# Patient Record
Sex: FEMALE | Race: WHITE | Employment: FULL TIME | URBAN - METROPOLITAN AREA
[De-identification: names, ages, dates, MRNs, and addresses within clinical notes are randomized per-mention and may not be internally consistent; named-entity substitution may affect disease eponyms.]

---

## 2017-10-06 ENCOUNTER — OFFICE VISIT (OUTPATIENT)
Dept: SURGERY | Age: 57
End: 2017-10-06

## 2017-10-06 VITALS
SYSTOLIC BLOOD PRESSURE: 135 MMHG | RESPIRATION RATE: 20 BRPM | DIASTOLIC BLOOD PRESSURE: 83 MMHG | BODY MASS INDEX: 32.6 KG/M2 | WEIGHT: 184 LBS | TEMPERATURE: 97.7 F | HEART RATE: 87 BPM | HEIGHT: 63 IN

## 2017-10-06 DIAGNOSIS — I72.8 SPLENIC ARTERY ANEURYSM (HCC): ICD-10-CM

## 2017-10-06 DIAGNOSIS — Z98.84 H/O LAPAROSCOPIC ADJUSTABLE GASTRIC BANDING: Primary | ICD-10-CM

## 2017-10-06 PROBLEM — K95.09 EROSION OF GASTRIC BAND: Status: ACTIVE | Noted: 2017-10-06

## 2017-10-06 NOTE — PROGRESS NOTES
Aldo Lord is a 56 y.o. female who presents today with   Chief Complaint   Patient presents with    Abdominal Pain     Pt pt presents today c/o abd  pain after eating for over 1 years. Pt had lap band placed in [facility] about 10 years ago. Ct Abd/Pelvis 9/9/2017                  1. Have you been to the ER, urgent care clinic since your last visit? Hospitalized since your last visit? No    2. Have you seen or consulted any other health care providers outside of the [facility] since your last visit? Include any pap smears or colon screening.  No

## 2017-10-06 NOTE — PROGRESS NOTES
Hernia Evaluation      Subjective:     Kevin Rider is a 56 y.o. female with a history of lap gastric banding in [facility] in . Her pre-op weight was 265. She lost to 173. She has had about 2 adjustments early in the course by a doctor in [place]. She has never followed up with her doctor in [facility].    For the last year, she complains of \"excruciating pain. \" She describes the pain as occurring about every other pain. The pain comes without relation to eating. She denies nausea or vomiting. The pain is sharp, lasting 20-30 minutes usually at the left costal margin. Patient Active Problem List    Diagnosis Date Noted    H/O laparoscopic adjustable gastric banding 10/06/2017    Erosion of gastric band 10/06/2017    Splenic artery aneurysm ([address] 10/06/2017     Past Medical History:   Diagnosis Date    Constipation     Coxsackie carditis       Past Surgical History:   Procedure Laterality Date    ADJUSTMENT GASTRIC BAND      HX HEENT      Plastic surgery to repair face after dog bite    HX LIPECTOMY        Social History   Substance Use Topics    Smoking status: Never Smoker    Smokeless tobacco: Never Used    Alcohol use Yes      Comment: occ      Family History   Problem Relation Age of Onset    Cancer Father      Throat CA, Smoker      Family Status   Relation Status    Mother Alive    Father        No current outpatient prescriptions on file. No current facility-administered medications for this visit.        No Known Allergies     Review of Systems:  Positive in BOLD    CONST: Fever, weight loss, fatigue or chills  GI: Nausea, vomiting, abdominal pain, change in bowel habits, hematochezia, melena, and GERD   INTEG: Dermatitis, abnormal moles  HEENT: Recent changes in vision, vertigo, epistaxis, dysphagia and hoarseness  CV: Chest pain, palpitations, HTN, edema and varicosities  RESP: Cough, shortness of breath, wheezing, hemoptysis, snoring and reactive airway disease  : Hematuria, dysuria, frequency, urgency, nocturia and stress urinary incontinence   MS: Weakness, joint pain and arthritis  ENDO: Diabetes, thyroid disease, polyuria, polydipsia, polyphagia, poor wound healing, heat intolerance, cold intolerance  LYMPH/HEME: Anemia, bruising and history of blood transfusions  NEURO: Dizziness, headache, fainting, seizures and stroke  PSYCH: Anxiety and depression    Objective:     Visit Vitals    /83 (BP 1 Location: Right arm, BP Patient Position: At rest)    Pulse 87    Temp 97.7 °F (36.5 °C) (Oral)    Resp 20    Ht 5' 3\" (1.6 m)    Wt 83.5 kg (184 lb)    BMI 32.59 kg/m2       Physical Exam:      GENERAL: alert, cooperative, no distress, appears stated age  EYE:conjunctivae and sclerae normal, pupils equal, round, reactive to light, extraocular movements intact without nystagmus  THROAT & NECK: no erythema or exudates noted and neck supple and symmetrical; no palpable masses  LUNG: clear to auscultation bilaterally  HEART: Regular rate and rhythm  ABDOMEN:abdomen is soft without significant tenderness, masses, organomegaly or guarding, left upper quadrant port site  EXTREMITIES:  extremities normal, atraumatic, no cyanosis or edema  SKIN: Normal.    Imaging and Lab Review:     CT - 1 and 1.1 cm splenic artery aneurysm    EGD - erosion of lap band. With attachment at angle of His    Assessment:   Eroded gastric band  Splenic artery aneurysm    Patient has significant symptoms and wishes to proceed with surgical intervention. Plan:   1. Splenic artery aneurysms - watchful waiting per vascular surgery  2. I explained the indications for lap explant of eroded gastric band as well as the alternatives.  I discussed the potential risks, benefits, and likely outcomes of this course of care, including but not limited to bleeding, wound infection, need for reoperation, injury to surrounding structures,gastric leak or abscess, failure to improve or even worsen her pain, anesthetic risks and imponderables to include death. The patient indicates understanding of the risks and wishes to proceed.           Signed By: Lizz Cormier MD     October 6, 2017

## 2017-10-06 NOTE — COMMUNICATION BODY
Hernia Evaluation      Subjective:     Felisha Loomis is a 56 y.o. female with a history of lap gastric banding in [facility] in . Her pre-op weight was 265. She lost to 173. She has had about 2 adjustments early in the course by a doctor in [facility]. She has never followed up with her doctor in [facility].    For the last year, she complains of \"excruciating pain. \" She describes the pain as occurring about every other pain. The pain comes without relation to eating. She denies nausea or vomiting. The pain is sharp, lasting 20-30 minutes usually at the left costal margin. Patient Active Problem List    Diagnosis Date Noted    H/O laparoscopic adjustable gastric banding 10/06/2017    Erosion of gastric band 10/06/2017    Splenic artery aneurysm ([address] 10/06/2017     Past Medical History:   Diagnosis Date    Constipation     Coxsackie carditis       Past Surgical History:   Procedure Laterality Date    ADJUSTMENT GASTRIC BAND      HX HEENT      Plastic surgery to repair face after dog bite    HX LIPECTOMY        Social History   Substance Use Topics    Smoking status: Never Smoker    Smokeless tobacco: Never Used    Alcohol use Yes      Comment: occ      Family History   Problem Relation Age of Onset    Cancer Father      Throat CA, Smoker      Family Status   Relation Status    Mother Alive    Father        No current outpatient prescriptions on file. No current facility-administered medications for this visit.        No Known Allergies     Review of Systems:  Positive in BOLD    CONST: Fever, weight loss, fatigue or chills  GI: Nausea, vomiting, abdominal pain, change in bowel habits, hematochezia, melena, and GERD   INTEG: Dermatitis, abnormal moles  HEENT: Recent changes in vision, vertigo, epistaxis, dysphagia and hoarseness  CV: Chest pain, palpitations, HTN, edema and varicosities  RESP: Cough, shortness of breath, wheezing, hemoptysis, snoring and reactive airway disease  : Hematuria, dysuria, frequency, urgency, nocturia and stress urinary incontinence   MS: Weakness, joint pain and arthritis  ENDO: Diabetes, thyroid disease, polyuria, polydipsia, polyphagia, poor wound healing, heat intolerance, cold intolerance  LYMPH/HEME: Anemia, bruising and history of blood transfusions  NEURO: Dizziness, headache, fainting, seizures and stroke  PSYCH: Anxiety and depression    Objective:     Visit Vitals    /83 (BP 1 Location: Right arm, BP Patient Position: At rest)    Pulse 87    Temp 97.7 °F (36.5 °C) (Oral)    Resp 20    Ht 5' 3\" (1.6 m)    Wt 83.5 kg (184 lb)    BMI 32.59 kg/m2       Physical Exam:      GENERAL: alert, cooperative, no distress, appears stated age  EYE:conjunctivae and sclerae normal, pupils equal, round, reactive to light, extraocular movements intact without nystagmus  THROAT & NECK: no erythema or exudates noted and neck supple and symmetrical; no palpable masses  LUNG: clear to auscultation bilaterally  HEART: Regular rate and rhythm  ABDOMEN:abdomen is soft without significant tenderness, masses, organomegaly or guarding, left upper quadrant port site  EXTREMITIES:  extremities normal, atraumatic, no cyanosis or edema  SKIN: Normal.    Imaging and Lab Review:     CT - 1 and 1.1 cm splenic artery aneurysm    EGD - erosion of lap band. With attachment at angle of His    Assessment:   Eroded gastric band  Splenic artery aneurysm    Patient has significant symptoms and wishes to proceed with surgical intervention. Plan:   1. Splenic artery aneurysms - watchful waiting per vascular surgery  2. I explained the indications for lap explant of eroded gastric band as well as the alternatives.  I discussed the potential risks, benefits, and likely outcomes of this course of care, including but not limited to bleeding, wound infection, need for reoperation, injury to surrounding structures,gastric leak or abscess, failure to improve or even worsen her pain, anesthetic risks and imponderables to include death. The patient indicates understanding of the risks and wishes to proceed.           Signed By: Lori Rachel MD     October 6, 2017

## 2017-10-06 NOTE — LETTER
10/6/2017 1:47 PM 
 
Patient:  Javier Martinez YOB: 1960 Date of Visit: 10/6/2017 Timoteo Mathews MD 
[address] [facility] [address] VIA Facsimile: [phone] Stef Crowe MD 
[address] [address] VIA Facsimile: [phone] Dear MD Stef Bond MD, [facility] and [facility]. Below are the relevant portions of my assessment and plan of care. Hernia Evaluation Subjective:  
 
Cassie Parra is a 56 y.o. female with a history of lap gastric banding in [facility] in 2007. Her pre-op weight was 265. She lost to 173. She has had about 2 adjustments early in the course by a doctor in [facility]. She has never followed up with her doctor in [facility]. 
 
For the last year, she complains of \"excruciating pain. \" She describes the pain as occurring about every other pain. The pain comes without relation to eating. She denies nausea or vomiting. The pain is sharp, lasting 20-30 minutes usually at the left costal margin. Patient Active Problem List  
 Diagnosis Date Noted  H/O laparoscopic adjustable gastric banding 10/06/2017  Erosion of gastric band 10/06/2017  Splenic artery aneurysm ([address] 10/06/2017 Past Medical History:  
Diagnosis Date  Constipation  Coxsackie carditis 1986 Past Surgical History:  
Procedure Laterality Date  ADJUSTMENT GASTRIC BAND  HX HEENT  [id number] Plastic surgery to repair face after dog bite  HX LIPECTOMY Social History Substance Use Topics  Smoking status: Never Smoker  Smokeless tobacco: Never Used  Alcohol use Yes Comment: occ Family History Problem Relation Age of Onset  Cancer Father Throat CA, Smoker Family Status Relation Status  Mother Alive  Father  No current outpatient prescriptions on file. No current facility-administered medications for this visit. No Known Allergies Review of Systems:  Positive in BOLD 
 
CONST: Fever, weight loss, fatigue or chills GI: Nausea, vomiting, abdominal pain, change in bowel habits, hematochezia, melena, and GERD INTEG: Dermatitis, abnormal moles HEENT: Recent changes in vision, vertigo, epistaxis, dysphagia and hoarseness CV: Chest pain, palpitations, HTN, edema and varicosities RESP: Cough, shortness of breath, wheezing, hemoptysis, snoring and reactive airway disease : Hematuria, dysuria, frequency, urgency, nocturia and stress urinary incontinence MS: Weakness, joint pain and arthritis ENDO: Diabetes, thyroid disease, polyuria, polydipsia, polyphagia, poor wound healing, heat intolerance, cold intolerance LYMPH/HEME: Anemia, bruising and history of blood transfusions NEURO: Dizziness, headache, fainting, seizures and stroke PSYCH: Anxiety and depression Objective:  
 
Visit Vitals  /83 (BP 1 Location: Right arm, BP Patient Position: At rest)  Pulse 87  Temp 97.7 °F (36.5 °C) (Oral)  Resp 20  
 Ht 5' 3\" (1.6 m)  Wt 83.5 kg (184 lb)  BMI 32.59 kg/m2 Physical Exam:   
 
GENERAL: alert, cooperative, no distress, appears stated age EYE:conjunctivae and sclerae normal, pupils equal, round, reactive to light, extraocular movements intact without nystagmus THROAT & NECK: no erythema or exudates noted and neck supple and symmetrical; no palpable masses LUNG: clear to auscultation bilaterally HEART: Regular rate and rhythm ABDOMEN:abdomen is soft without significant tenderness, masses, organomegaly or guarding, left upper quadrant port site EXTREMITIES:  extremities normal, atraumatic, no cyanosis or edema SKIN: Normal. 
 
Imaging and Lab Review: CT - 1 and 1.1 cm splenic artery aneurysm EGD - erosion of lap band. With attachment at angle of His Assessment:  
Eroded gastric band Splenic artery aneurysm Patient has significant symptoms and wishes to proceed with surgical intervention. Plan: 1. Splenic artery aneurysms - watchful waiting per vascular surgery 2. I explained the indications for lap explant of eroded gastric band as well as the alternatives. I discussed the potential risks, benefits, and likely outcomes of this course of care, including but not limited to bleeding, wound infection, need for reoperation, injury to surrounding structures,gastric leak or abscess, failure to improve or even worsen her pain, anesthetic risks and imponderables to include death. The patient indicates understanding of the risks and wishes to proceed. Signed By: Perico Tam MD   
 October 6, 2017 Thank you very much for your referral of Ms. Chen Shaw. If you have questions, please do not hesitate to call me. I look forward to following MsMoy Barbara Wahl along with you and will keep you updated as to her progress.   
 
 
 
 
Sincerely, 
 
 
Perico Tam MD

## 2017-11-16 ENCOUNTER — TELEPHONE (OUTPATIENT)
Dept: SURGERY | Age: 57
End: 2017-11-16

## 2017-11-16 NOTE — TELEPHONE ENCOUNTER
Called to check status of patients auth. She has been approved for the the band removal ONLY. LVM to let pt know. Dorina Thomas number is: [phone] good for code 83355 (this is the code the insurance company uses for band removal) Code is good from 11/06/17-05/06/18 outpatient ONLY. Pt can be schedule once she calls back at [facility] any day between these dates.

## 2017-11-17 ENCOUNTER — ANESTHESIA EVENT (OUTPATIENT)
Dept: SURGERY | Age: 57
End: 2017-11-17
Payer: COMMERCIAL

## 2017-11-20 ENCOUNTER — HOSPITAL ENCOUNTER (OUTPATIENT)
Age: 57
Setting detail: OBSERVATION
Discharge: HOME | End: 2017-11-21
Attending: SURGERY | Admitting: SURGERY
Payer: COMMERCIAL

## 2017-11-20 ENCOUNTER — ANESTHESIA (OUTPATIENT)
Dept: SURGERY | Age: 57
End: 2017-11-20
Payer: COMMERCIAL

## 2017-11-20 PROBLEM — E66.01 MORBID OBESITY (HCC): Status: ACTIVE | Noted: 2017-11-20

## 2017-11-20 PROBLEM — K95.09 COMPLICATION OF GASTRIC BANDING: Status: ACTIVE | Noted: 2017-11-20

## 2017-11-20 LAB — HCG UR QL: NEGATIVE

## 2017-11-20 PROCEDURE — 74011250636 HC RX REV CODE- 250/636: Performed by: NURSE ANESTHETIST, CERTIFIED REGISTERED

## 2017-11-20 PROCEDURE — 81025 URINE PREGNANCY TEST: CPT

## 2017-11-20 PROCEDURE — 77030008683 HC TU ET CUF COVD -A: Performed by: ANESTHESIOLOGY

## 2017-11-20 PROCEDURE — 74011250637 HC RX REV CODE- 250/637: Performed by: SURGERY

## 2017-11-20 PROCEDURE — 77030002933 HC SUT MCRYL J&J -A: Performed by: SURGERY

## 2017-11-20 PROCEDURE — 77030018846 HC SOL IRR STRL H20 ICUM -A: Performed by: SURGERY

## 2017-11-20 PROCEDURE — 77030008477 HC STYL SATN SLP COVD -A: Performed by: ANESTHESIOLOGY

## 2017-11-20 PROCEDURE — 77030031139 HC SUT VCRL2 J&J -A: Performed by: SURGERY

## 2017-11-20 PROCEDURE — 77030009852 HC PCH RTVR ENDOSC COVD -B: Performed by: SURGERY

## 2017-11-20 PROCEDURE — 76210000006 HC OR PH I REC 0.5 TO 1 HR: Performed by: SURGERY

## 2017-11-20 PROCEDURE — 99218 HC RM OBSERVATION: CPT

## 2017-11-20 PROCEDURE — 51798 US URINE CAPACITY MEASURE: CPT

## 2017-11-20 PROCEDURE — 88300 SURGICAL PATH GROSS: CPT | Performed by: SURGERY

## 2017-11-20 PROCEDURE — 77030018834: Performed by: SURGERY

## 2017-11-20 PROCEDURE — 77030013079 HC BLNKT BAIR HGGR 3M -A: Performed by: ANESTHESIOLOGY

## 2017-11-20 PROCEDURE — 77030016151 HC PROTCTR LNS DFOG COVD -B: Performed by: SURGERY

## 2017-11-20 PROCEDURE — 77030005515 HC CATH URETH FOL14 BARD -B: Performed by: SURGERY

## 2017-11-20 PROCEDURE — 77030020255 HC SOL INJ LR 1000ML BG

## 2017-11-20 PROCEDURE — 74011250636 HC RX REV CODE- 250/636

## 2017-11-20 PROCEDURE — 96374 THER/PROPH/DIAG INJ IV PUSH: CPT

## 2017-11-20 PROCEDURE — 77030035051: Performed by: SURGERY

## 2017-11-20 PROCEDURE — 77030008603 HC TRCR ENDOSC EPATH J&J -C: Performed by: SURGERY

## 2017-11-20 PROCEDURE — 76010000149 HC OR TIME 1 TO 1.5 HR: Performed by: SURGERY

## 2017-11-20 PROCEDURE — 77030018823 HC SLV COMPR VENO -B: Performed by: SURGERY

## 2017-11-20 PROCEDURE — 76060000033 HC ANESTHESIA 1 TO 1.5 HR: Performed by: SURGERY

## 2017-11-20 PROCEDURE — 77030027491 HC SHR ENDOSC COVD -B: Performed by: SURGERY

## 2017-11-20 PROCEDURE — 74011000250 HC RX REV CODE- 250: Performed by: SURGERY

## 2017-11-20 PROCEDURE — 74011250636 HC RX REV CODE- 250/636: Performed by: SURGERY

## 2017-11-20 PROCEDURE — 77030009393: Performed by: SURGERY

## 2017-11-20 PROCEDURE — 74011000250 HC RX REV CODE- 250

## 2017-11-20 PROCEDURE — 77030008602 HC TRCR ENDOSC EPATH J&J -B: Performed by: SURGERY

## 2017-11-20 PROCEDURE — 77030032490 HC SLV COMPR SCD KNE COVD -B: Performed by: SURGERY

## 2017-11-20 PROCEDURE — 77030036583 HC TRCR CANN BLDLSS OPT MEDT -B: Performed by: SURGERY

## 2017-11-20 PROCEDURE — 77030033639 HC SHR ENDO COAG HARM 36 J&J -E: Performed by: SURGERY

## 2017-11-20 PROCEDURE — 74011000272 HC RX REV CODE- 272: Performed by: SURGERY

## 2017-11-20 RX ORDER — OXYCODONE HYDROCHLORIDE 5 MG/1
5-10 TABLET ORAL
Status: DISCONTINUED | OUTPATIENT
Start: 2017-11-20 | End: 2017-11-21 | Stop reason: HOSPADM

## 2017-11-20 RX ORDER — ENOXAPARIN SODIUM 100 MG/ML
40 INJECTION SUBCUTANEOUS DAILY
Status: DISCONTINUED | OUTPATIENT
Start: 2017-11-21 | End: 2017-11-21 | Stop reason: HOSPADM

## 2017-11-20 RX ORDER — DEXAMETHASONE SODIUM PHOSPHATE 4 MG/ML
INJECTION, SOLUTION INTRA-ARTICULAR; INTRALESIONAL; INTRAMUSCULAR; INTRAVENOUS; SOFT TISSUE AS NEEDED
Status: DISCONTINUED | OUTPATIENT
Start: 2017-11-20 | End: 2017-11-20 | Stop reason: HOSPADM

## 2017-11-20 RX ORDER — VECURONIUM BROMIDE FOR INJECTION 1 MG/ML
INJECTION, POWDER, LYOPHILIZED, FOR SOLUTION INTRAVENOUS AS NEEDED
Status: DISCONTINUED | OUTPATIENT
Start: 2017-11-20 | End: 2017-11-20 | Stop reason: HOSPADM

## 2017-11-20 RX ORDER — CEFAZOLIN SODIUM 2 G/50ML
2 SOLUTION INTRAVENOUS ONCE
Status: COMPLETED | OUTPATIENT
Start: 2017-11-20 | End: 2017-11-20

## 2017-11-20 RX ORDER — HYDROMORPHONE HCL IN 0.9% NACL 50 MG/50ML
1 PLASTIC BAG, INJECTION (ML) INJECTION
Status: DISCONTINUED | OUTPATIENT
Start: 2017-11-20 | End: 2017-11-21 | Stop reason: HOSPADM

## 2017-11-20 RX ORDER — ENOXAPARIN SODIUM 100 MG/ML
40 INJECTION SUBCUTANEOUS ONCE
Status: COMPLETED | OUTPATIENT
Start: 2017-11-20 | End: 2017-11-20

## 2017-11-20 RX ORDER — NEOSTIGMINE METHYLSULFATE 5 MG/5 ML
SYRINGE (ML) INTRAVENOUS AS NEEDED
Status: DISCONTINUED | OUTPATIENT
Start: 2017-11-20 | End: 2017-11-20 | Stop reason: HOSPADM

## 2017-11-20 RX ORDER — SODIUM CHLORIDE, SODIUM LACTATE, POTASSIUM CHLORIDE, CALCIUM CHLORIDE 600; 310; 30; 20 MG/100ML; MG/100ML; MG/100ML; MG/100ML
50 INJECTION, SOLUTION INTRAVENOUS CONTINUOUS
Status: DISCONTINUED | OUTPATIENT
Start: 2017-11-20 | End: 2017-11-20 | Stop reason: HOSPADM

## 2017-11-20 RX ORDER — MIDAZOLAM HYDROCHLORIDE 1 MG/ML
INJECTION, SOLUTION INTRAMUSCULAR; INTRAVENOUS AS NEEDED
Status: DISCONTINUED | OUTPATIENT
Start: 2017-11-20 | End: 2017-11-20 | Stop reason: HOSPADM

## 2017-11-20 RX ORDER — ONDANSETRON 2 MG/ML
4 INJECTION INTRAMUSCULAR; INTRAVENOUS
Status: COMPLETED | OUTPATIENT
Start: 2017-11-20 | End: 2017-11-20

## 2017-11-20 RX ORDER — ACETAMINOPHEN 325 MG/1
650 TABLET ORAL
Status: DISCONTINUED | OUTPATIENT
Start: 2017-11-20 | End: 2017-11-21 | Stop reason: HOSPADM

## 2017-11-20 RX ORDER — DIPHENHYDRAMINE HYDROCHLORIDE 50 MG/ML
25 INJECTION, SOLUTION INTRAMUSCULAR; INTRAVENOUS
Status: DISCONTINUED | OUTPATIENT
Start: 2017-11-20 | End: 2017-11-21 | Stop reason: HOSPADM

## 2017-11-20 RX ORDER — HYDROMORPHONE HYDROCHLORIDE 2 MG/ML
0.5 INJECTION, SOLUTION INTRAMUSCULAR; INTRAVENOUS; SUBCUTANEOUS
Status: DISCONTINUED | OUTPATIENT
Start: 2017-11-20 | End: 2017-11-20 | Stop reason: HOSPADM

## 2017-11-20 RX ORDER — ONDANSETRON 2 MG/ML
4 INJECTION INTRAMUSCULAR; INTRAVENOUS
Status: DISCONTINUED | OUTPATIENT
Start: 2017-11-20 | End: 2017-11-21 | Stop reason: HOSPADM

## 2017-11-20 RX ORDER — SODIUM CHLORIDE, SODIUM LACTATE, POTASSIUM CHLORIDE, CALCIUM CHLORIDE 600; 310; 30; 20 MG/100ML; MG/100ML; MG/100ML; MG/100ML
75 INJECTION, SOLUTION INTRAVENOUS CONTINUOUS
Status: DISCONTINUED | OUTPATIENT
Start: 2017-11-21 | End: 2017-11-20 | Stop reason: HOSPADM

## 2017-11-20 RX ORDER — ONDANSETRON 2 MG/ML
INJECTION INTRAMUSCULAR; INTRAVENOUS AS NEEDED
Status: DISCONTINUED | OUTPATIENT
Start: 2017-11-20 | End: 2017-11-20 | Stop reason: HOSPADM

## 2017-11-20 RX ORDER — KETOROLAC TROMETHAMINE 30 MG/ML
30 INJECTION, SOLUTION INTRAMUSCULAR; INTRAVENOUS EVERY 6 HOURS
Status: COMPLETED | OUTPATIENT
Start: 2017-11-20 | End: 2017-11-21

## 2017-11-20 RX ORDER — FENTANYL CITRATE 50 UG/ML
25 INJECTION, SOLUTION INTRAMUSCULAR; INTRAVENOUS AS NEEDED
Status: DISCONTINUED | OUTPATIENT
Start: 2017-11-20 | End: 2017-11-20 | Stop reason: HOSPADM

## 2017-11-20 RX ORDER — GLYCOPYRROLATE 0.2 MG/ML
INJECTION INTRAMUSCULAR; INTRAVENOUS AS NEEDED
Status: DISCONTINUED | OUTPATIENT
Start: 2017-11-20 | End: 2017-11-20 | Stop reason: HOSPADM

## 2017-11-20 RX ORDER — FENTANYL CITRATE 50 UG/ML
INJECTION, SOLUTION INTRAMUSCULAR; INTRAVENOUS AS NEEDED
Status: DISCONTINUED | OUTPATIENT
Start: 2017-11-20 | End: 2017-11-20 | Stop reason: HOSPADM

## 2017-11-20 RX ORDER — PROPOFOL 10 MG/ML
INJECTION, EMULSION INTRAVENOUS AS NEEDED
Status: DISCONTINUED | OUTPATIENT
Start: 2017-11-20 | End: 2017-11-20 | Stop reason: HOSPADM

## 2017-11-20 RX ORDER — LIDOCAINE HYDROCHLORIDE 20 MG/ML
INJECTION, SOLUTION EPIDURAL; INFILTRATION; INTRACAUDAL; PERINEURAL AS NEEDED
Status: DISCONTINUED | OUTPATIENT
Start: 2017-11-20 | End: 2017-11-20 | Stop reason: HOSPADM

## 2017-11-20 RX ORDER — PANTOPRAZOLE SODIUM 40 MG/1
40 GRANULE, DELAYED RELEASE ORAL
Status: DISCONTINUED | OUTPATIENT
Start: 2017-11-21 | End: 2017-11-21 | Stop reason: HOSPADM

## 2017-11-20 RX ORDER — SODIUM CHLORIDE, SODIUM LACTATE, POTASSIUM CHLORIDE, CALCIUM CHLORIDE 600; 310; 30; 20 MG/100ML; MG/100ML; MG/100ML; MG/100ML
150 INJECTION, SOLUTION INTRAVENOUS CONTINUOUS
Status: DISCONTINUED | OUTPATIENT
Start: 2017-11-20 | End: 2017-11-21 | Stop reason: HOSPADM

## 2017-11-20 RX ADMIN — FENTANYL CITRATE 100 MCG: 50 INJECTION, SOLUTION INTRAMUSCULAR; INTRAVENOUS at 15:09

## 2017-11-20 RX ADMIN — OXYCODONE HYDROCHLORIDE 5 MG: 5 TABLET ORAL at 23:51

## 2017-11-20 RX ADMIN — ONDANSETRON 4 MG: 2 INJECTION INTRAMUSCULAR; INTRAVENOUS at 23:47

## 2017-11-20 RX ADMIN — FENTANYL CITRATE 50 MCG: 50 INJECTION, SOLUTION INTRAMUSCULAR; INTRAVENOUS at 16:26

## 2017-11-20 RX ADMIN — SODIUM CHLORIDE, POTASSIUM CHLORIDE, SODIUM LACTATE AND CALCIUM CHLORIDE 75 ML/HR: 600; 310; 30; 20 INJECTION, SOLUTION INTRAVENOUS at 12:59

## 2017-11-20 RX ADMIN — PROPOFOL 160 MG: 10 INJECTION, EMULSION INTRAVENOUS at 15:10

## 2017-11-20 RX ADMIN — KETOROLAC TROMETHAMINE 30 MG: 30 INJECTION, SOLUTION INTRAMUSCULAR at 21:24

## 2017-11-20 RX ADMIN — ENOXAPARIN SODIUM 40 MG: 40 INJECTION SUBCUTANEOUS at 13:07

## 2017-11-20 RX ADMIN — CEFAZOLIN SODIUM 2 G: 2 SOLUTION INTRAVENOUS at 15:15

## 2017-11-20 RX ADMIN — FENTANYL CITRATE 25 MCG: 50 INJECTION, SOLUTION INTRAMUSCULAR; INTRAVENOUS at 15:55

## 2017-11-20 RX ADMIN — VECURONIUM BROMIDE FOR INJECTION 3 MG: 1 INJECTION, POWDER, LYOPHILIZED, FOR SOLUTION INTRAVENOUS at 15:10

## 2017-11-20 RX ADMIN — VECURONIUM BROMIDE FOR INJECTION 0.5 MG: 1 INJECTION, POWDER, LYOPHILIZED, FOR SOLUTION INTRAVENOUS at 15:36

## 2017-11-20 RX ADMIN — LIDOCAINE HYDROCHLORIDE 100 MG: 20 INJECTION, SOLUTION EPIDURAL; INFILTRATION; INTRACAUDAL; PERINEURAL at 15:10

## 2017-11-20 RX ADMIN — ONDANSETRON 4 MG: 2 INJECTION INTRAMUSCULAR; INTRAVENOUS at 15:16

## 2017-11-20 RX ADMIN — SODIUM CHLORIDE, SODIUM LACTATE, POTASSIUM CHLORIDE, AND CALCIUM CHLORIDE 150 ML/HR: 600; 310; 30; 20 INJECTION, SOLUTION INTRAVENOUS at 21:25

## 2017-11-20 RX ADMIN — SODIUM CHLORIDE, POTASSIUM CHLORIDE, SODIUM LACTATE AND CALCIUM CHLORIDE 50 ML/HR: 600; 310; 30; 20 INJECTION, SOLUTION INTRAVENOUS at 17:30

## 2017-11-20 RX ADMIN — Medication 1 MG: at 19:39

## 2017-11-20 RX ADMIN — Medication 3 MG: at 16:17

## 2017-11-20 RX ADMIN — DEXAMETHASONE SODIUM PHOSPHATE 4 MG: 4 INJECTION, SOLUTION INTRA-ARTICULAR; INTRALESIONAL; INTRAMUSCULAR; INTRAVENOUS; SOFT TISSUE at 15:10

## 2017-11-20 RX ADMIN — ONDANSETRON 4 MG: 2 INJECTION INTRAMUSCULAR; INTRAVENOUS at 17:12

## 2017-11-20 RX ADMIN — GLYCOPYRROLATE 0.4 MG: 0.2 INJECTION INTRAMUSCULAR; INTRAVENOUS at 16:17

## 2017-11-20 RX ADMIN — MIDAZOLAM HYDROCHLORIDE 2 MG: 1 INJECTION, SOLUTION INTRAMUSCULAR; INTRAVENOUS at 15:06

## 2017-11-20 RX ADMIN — FENTANYL CITRATE 25 MCG: 50 INJECTION, SOLUTION INTRAMUSCULAR; INTRAVENOUS at 17:12

## 2017-11-20 RX ADMIN — FENTANYL CITRATE 25 MCG: 50 INJECTION, SOLUTION INTRAMUSCULAR; INTRAVENOUS at 15:47

## 2017-11-20 RX ADMIN — SODIUM CHLORIDE 1000 ML: 900 INJECTION, SOLUTION INTRAVENOUS at 16:50

## 2017-11-20 NOTE — PERIOP NOTES
1637:  Patient arrived to PACU Nayan Mahoney RN assigned as Nurse, Report received from Anesthesia & OR Nurse, (Procedure, I &O, Pain Meds & Vitals)  Pt connected to monitor, Post Op pain & nursing assessment complete, will continue to monitor. 5 trocar sites:  CDI with dermabond    1650: I L Bolus NS given    1715:  zofran 4mg  & Fentanyl 25mcgs given     1900:  TRANSFER - OUT REPORT:    Verbal report given to MARKIE Fuchs(name) on [facility]  being transferred to 2 Central(unit) for routine post - op       Report consisted of patients Situation, Background, Assessment and   Recommendations(SBAR). Information from the following report(s) SBAR, OR Summary, Procedure Summary and Cardiac Rhythm SR was reviewed with the receiving nurse. Lines:   Peripheral IV 11/20/17 Left Antecubital (Active)   Site Assessment Clean, dry, & intact 11/20/2017  5:30 PM   Phlebitis Assessment 0 11/20/2017  5:30 PM   Infiltration Assessment 0 11/20/2017  5:30 PM   Dressing Status Clean, dry, & intact 11/20/2017  5:30 PM   Dressing Type Transparent 11/20/2017  5:30 PM   Hub Color/Line Status Pink; Infusing; End cap changed 11/20/2017  5:30 PM   Action Taken Open ports on tubing capped 11/20/2017  5:30 PM   Alcohol Cap Used Yes 11/20/2017  5:30 PM        Opportunity for questions and clarification was provided.       Patient transported with:   Registered Nurse     Visit Vitals    /63    Pulse 69    Temp 97.3 °F (36.3 °C)    Resp 19    Ht 5' 4\" (1.626 m)    Wt 82.1 kg (181 lb)    SpO2 99%    BMI 31.07 kg/m2       Transfer:    Visit Vitals    /57    Pulse 69    Temp 97.3 °F (36.3 °C)    Resp 19    Ht 5' 4\" (1.626 m)    Wt 82.1 kg (181 lb)    SpO2 98%    BMI 31.07 kg/m2

## 2017-11-20 NOTE — ANESTHESIA POSTPROCEDURE EVALUATION
Post-Anesthesia Evaluation and Assessment    Patient: Maximiliano Johnson MRN: [id number]  SSN: [id number]    YOB: 1960  Age: 56 y.o. Sex: female       Cardiovascular Function/Vital Signs  Visit Vitals    /63    Pulse 69    Temp 36.3 °C (97.3 °F)    Resp 19    Ht 5' 4\" (1.626 m)    Wt 82.1 kg (181 lb)    SpO2 98%    BMI 31.07 kg/m2       Patient is status post general anesthesia for Procedure(s):  band removal.    Nausea/Vomiting: None    Postoperative hydration reviewed and adequate. Pain:  Pain Scale 1: Numeric (0 - 10) (11/20/17 1710)  Pain Intensity 1: 4 (11/20/17 1710)   Managed    Neurological Status:   Neuro (WDL): Within Defined Limits (11/20/17 1640)   At baseline    Mental Status and Level of Consciousness: Arousable    Pulmonary Status:   O2 Device: Oxygen mask (11/20/17 1638)   Adequate oxygenation and airway patent    Complications related to anesthesia: None    Post-anesthesia assessment completed.  No concerns    Signed By: Anastacia Burns MD     November 20, 2017

## 2017-11-20 NOTE — H&P
Surgical Evaluation        Subjective:      Debby Cuevas is a 56 y.o. female with a history of lap gastric banding in [facility] in . Her pre-op weight was 265. She lost to 173. She has had about 2 adjustments early in the course by a doctor in [place]. She has never followed up with her doctor in [facility].     For the last year, she complains of \"excruciating pain. \" She describes the pain as occurring about every other pain. The pain comes without relation to eating. She denies nausea or vomiting. The pain is sharp, lasting 20-30 minutes usually at the left costal margin.              Patient Active Problem List     Diagnosis Date Noted    H/O laparoscopic adjustable gastric banding 10/06/2017    Erosion of gastric band 10/06/2017    Splenic artery aneurysm ([address] 10/06/2017      Past Medical History:   Diagnosis Date    Constipation      Coxsackie carditis             Past Surgical History:   Procedure Laterality Date    ADJUSTMENT GASTRIC BAND        HX HEENT        Plastic surgery to repair face after dog bite    HX LIPECTOMY                  Social History    Substance Use Topics     Smoking status: Never Smoker     Smokeless tobacco: Never Used     Alcohol use Yes         Comment: occ              Family History   Problem Relation Age of Onset    Cancer Father         Throat CA, Smoker           Family Status   Relation Status    Mother Alive    Father          No current outpatient prescriptions on file.      No current facility-administered medications for this visit.        No Known Allergies      Review of Systems:  Positive in BOLD - no change from October     CONST: Fever, weight loss, fatigue or chills  GI: Nausea, vomiting, abdominal pain, change in bowel habits, hematochezia, melena, and GERD   INTEG: Dermatitis, abnormal moles  HEENT: Recent changes in vision, vertigo, epistaxis, dysphagia and hoarseness  CV: Chest pain, palpitations, HTN, edema and varicosities  RESP: Cough, shortness of breath, wheezing, hemoptysis, snoring and reactive airway disease  : Hematuria, dysuria, frequency, urgency, nocturia and stress urinary incontinence   MS: Weakness, joint pain and arthritis  ENDO: Diabetes, thyroid disease, polyuria, polydipsia, polyphagia, poor wound healing, heat intolerance, cold intolerance  LYMPH/HEME: Anemia, bruising and history of blood transfusions  NEURO: Dizziness, headache, fainting, seizures and stroke  PSYCH: Anxiety and depression     Objective:      Visit Vitals    /69 (BP 1 Location: Left arm, BP Patient Position: At rest)    Pulse 74    Temp 97.9 °F (36.6 °C)    Resp 18    Ht 5' 4\" (1.626 m)    Wt 82.1 kg (181 lb)    SpO2 100%    BMI 31.07 kg/m2          Physical Exam:       GENERAL: alert, cooperative, no distress, appears stated age  EYE:conjunctivae and sclerae normal, pupils equal, round, reactive to light, extraocular movements intact without nystagmus  THROAT & NECK: no erythema or exudates noted and neck supple and symmetrical; no palpable masses  LUNG: clear to auscultation bilaterally  HEART: Regular rate and rhythm  ABDOMEN:abdomen is soft without significant tenderness, masses, organomegaly or guarding, left upper quadrant port site  EXTREMITIES:  extremities normal, atraumatic, no cyanosis or edema  SKIN: Normal.     Imaging and Lab Review:      CT - 1 and 1.1 cm splenic artery aneurysm     EGD - erosion of lap band. With attachment at angle of His - per verbal communication with Dr. Della Cast     Assessment:   Eroded gastric band  Splenic artery aneurysm     Patient has significant symptoms and wishes to proceed with surgical intervention.     Plan:   1. Splenic artery aneurysms - watchful waiting per vascular surgery  2. I explained the indications for lap explant of eroded gastric band as well as the alternatives.  I discussed the potential risks, benefits, and likely outcomes of this course of care, including but not limited to bleeding, wound infection, need for reoperation, injury to surrounding structures,gastric leak or abscess, failure to improve or even worsen her pain, anesthetic risks and imponderables to include death. The patient indicates understanding of the risks and wishes to proceed. She understands that we will specifically be working from within the stomach and not be dissecting the tissues around the stomach apart from the gastrotomy for its access and removal. She understands that her insurance has only approved outpatient stay.

## 2017-11-20 NOTE — ANESTHESIA PREPROCEDURE EVALUATION
Anesthetic History               Review of Systems / Medical History  Patient summary reviewed and pertinent labs reviewed    Pulmonary  Within defined limits                 Neuro/Psych   Within defined limits           Cardiovascular  Within defined limits                     GI/Hepatic/Renal  Within defined limits              Endo/Other        Obesity     Other Findings   Comments:   Risk Factors for Postoperative nausea/vomiting:       History of postoperative nausea/vomiting? NO       Female? YES       Motion sickness? NO       Intended opioid administration for postoperative analgesia? YES      Smoking Abstinence  Current Smoker? NO  Elective Surgery? YES  Seen preoperatively by anesthesiologist or proxy prior to day of surgery? YES  Pt abstained from smoking 24 hours prior to anesthesia?  N/A           Physical Exam    Airway  Mallampati: II  TM Distance: 4 - 6 cm  Neck ROM: normal range of motion   Mouth opening: Normal     Cardiovascular    Rhythm: regular  Rate: normal         Dental    Dentition: Poor dentition     Pulmonary  Breath sounds clear to auscultation               Abdominal  GI exam deferred       Other Findings            Anesthetic Plan    ASA: 2  Anesthesia type: general          Induction: Intravenous  Anesthetic plan and risks discussed with: Patient

## 2017-11-20 NOTE — IP AVS SNAPSHOT
[address] Patient: No Delatorre MRN: [id number] FREDRICK:[id number] About your hospitalization You were admitted on:  November 20, 2017 You last received care in the:  [address] [place] [address],[address] You were discharged on:  November 21, 2017 Why you were hospitalized Your primary diagnosis was:  Not on File Your diagnoses also included: Morbid Obesity (Hcc), Complication Of Gastric Banding Things You Need To Do (next 8 weeks) Follow up with Cal Oliver MD  
  
Phone:  [phone] Where:  [address] [facility] [address] Follow up with Jayne Ervin MD in 2 week(s)  
for follow up Phone:  [phone] Where:  [address] [facility], [address] [facility], [address] [address] Thursday Dec 07, 2017 POST OP with Jayne Ervin MD at 11:45 AM  
Where: [address] ([address] [facility]) Discharge Orders None A check abhishek indicates which time of day the medication should be taken. My Medications TAKE these medications as instructed Instructions Each Dose to Equal  
 Morning Noon Evening Bedtime HYDROcodone-acetaminophen 0.5-21.7 mg/mL oral solution Commonly known as:  HYCET Your last dose was: Your next dose is: Take 15-30 mL by mouth four (4) times daily as needed for Pain. Max Daily Amount: 60 mg.  
 15-30 mL Where to Get Your Medications Information on where to get these meds will be given to you by the nurse or doctor. ! Ask your nurse or doctor about these medications HYDROcodone-acetaminophen 0.5-21.7 mg/mL oral solution Discharge Instructions DISCHARGE SUMMARY from Nurse PATIENT INSTRUCTIONS: 
 
 
F-face looks uneven A-arms unable to move or move unevenly S-speech slurred or non-existent T-time-call 911 as soon as signs and symptoms begin-DO NOT go Back to bed or wait to see if you get better-TIME IS BRAIN. Warning Signs of HEART ATTACK Call 911 if you have these symptoms: 
? Chest discomfort. Most heart attacks involve discomfort in the center of the chest that lasts more than a few minutes, or that goes away and comes back. It can feel like uncomfortable pressure, squeezing, fullness, or pain. ? Discomfort in other areas of the upper body. Symptoms can include pain or discomfort in one or both arms, the back, neck, jaw, or stomach. ? Shortness of breath with or without chest discomfort. ? Other signs may include breaking out in a cold sweat, nausea, or lightheadedness. Don't wait more than five minutes to call [address]! Fast action can save your life. Calling 911 is almost always the fastest way to get lifesaving treatment. Emergency Medical Services staff can begin treatment when they arrive  up to an hour sooner than if someone gets to the hospital by car. The discharge information has been reviewed with the patient. The patient verbalized understanding. Discharge medications reviewed with the patient and appropriate educational materials and side effects teaching were provided. Patient armband removed and shredded. ___________________________________________________________________________________________________________________________________ Introducing [facility]! Daivd Saenz introduces PhotoBox patient portal. Now you can access parts of your medical record, email your doctor's office, and request medication refills online. 1. In your internet browser, go to [email or web address] 2. Click on the First Time User? Click Here link in the Sign In box. You will see the New Member Sign Up page. 3. Enter your PhotoBox Access Code exactly as it appears below. You will not need to use this code after youve completed the sign-up process. If you do not sign up before the expiration date, you must request a new code. · PhotoBox Access Code: [email or web address] Expires: 12/8/2017 11:42 AM 
 
4. Enter the last four digits of your Social Security Number (xxxx) and Date of Birth (mm/dd/yyyy) as indicated and click Submit. You will be taken to the next sign-up page. 5. Create a PhotoBox ID. This will be your PhotoBox login ID and cannot be changed, so think of one that is secure and easy to remember. 6. Create a PhotoBox password. You can change your password at any time. 7. Enter your Password Reset Question and Answer. This can be used at a later time if you forget your password. 8. Enter your e-mail address. You will receive e-mail notification when new information is available in [address] 9. Click Sign Up. You can now view and download portions of your medical record. 10. Click the Download Summary menu link to download a portable copy of your medical information. If you have questions, please visit the Frequently Asked Questions section of the PhotoBox website. Remember, PhotoBox is NOT to be used for urgent needs. For medical emergencies, dial 911. Now available from your iPhone and Android! Providers Seen During Your Hospitalization Provider Specialty Primary office phone Jhony Dunham MD General Surgery [phone] Your Primary Care Physician (PCP) Primary Care Physician Office Phone Office Fax [phone] [address] [place] [address] [phone] You are allergic to the following No active allergies Recent Documentation Height Weight BMI OB Status Smoking Status 1.626 m 82.1 kg 31.07 kg/m2 Menopause Never Smoker Emergency Contacts Name Discharge Info Relation Home Work Mobile Sunita Hallmark DISCHARGE CAREGIVER [3] Parent [1] [phone] Patient Belongings The following personal items are in your possession at time of discharge: 
  Dental Appliances: None  Visual Aid: Glasses, With patient      Home Medications: None   Jewelry: None  Clothing: Shirt, Socks, Footwear, Undergarments, Pants (everything given to mom)    Other Valuables: None Discharge Instructions Attachments/References HYDROCODONE/ACETAMINOPHEN (BY MOUTH) (ENGLISH) Patient Handouts Hydrocodone/Acetaminophen (By mouth) Acetaminophen (d-lajs-c-MIN-oh-fen), Hydrocodone Bitartrate (tlg-jgtj-LKQ-done bye-TAR-trate) Treats pain. This medicine contains a narcotic pain reliever. Brand Name(s): Hycet, Lorcet, Lorcet HD, Lorcet Plus, Lortab 10/325, Lortab 5/325, Lortab 7.5/325, Lortab Elixir, Norco, Verdrocet, Vicodin, Vicodin ES, Vicodin HP, Xodol, Xodol 5/300 There may be other brand names for this medicine. When This Medicine Should Not Be Used: This medicine is not right for everyone. Do not use it if you had an allergic reaction to acetaminophen, hydrocodone, or other narcotic medicines, or stomach or bowel blockage (including paralytic ileus). How to Use This Medicine:  
Capsule, Liquid, Tablet · Your doctor will tell you how much medicine to use. Do not use more than directed. · An overdose can be dangerous. Follow directions carefully so you do not get too much medicine at one time. · Oral liquid: Measure the oral liquid medicine with a marked measuring spoon, oral syringe, or medicine cup. · Drink plenty of liquids to help avoid constipation. · This medicine should come with a Medication Guide. Ask your pharmacist for a copy if you do not have one. · Missed dose: Take a dose as soon as you remember. If it is almost time for your next dose, wait until then and take a regular dose. Do not take extra medicine to make up for a missed dose. · Store the medicine in a closed container at room temperature, away from heat, moisture, and direct light. Flush any unused Norco® tablets down the toilet. Drugs and Foods to Avoid: Ask your doctor or pharmacist before using any other medicine, including over-the-counter medicines, vitamins, and herbal products. · Do not use this medicine if you are using or have used an MAO inhibitor within the past 14 days. · Some medicines can affect how hydrocodone/acetaminophen works. Tell your doctor if you are using any of the following: ¨ Carbamazepine, erythromycin, ketoconazole, mirtazapine, phenytoin, rifampin, ritonavir, tramadol, trazodone ¨ Diuretic (water pill) ¨ Medicine to treat depression or mental health problems ¨ Medicine to treat migraine headaches ¨ Phenothiazine medicine · Tell your doctor if you use anything else that makes you sleepy. Some examples are allergy medicine, narcotic pain medicine, and alcohol. Tell your doctor if you are using buprenorphine, butorphanol, nalbuphine, pentazocine, or a muscle relaxer. · Do not drink alcohol while you are using this medicine. Acetaminophen can damage your liver, and your risk is higher if you also drink alcohol. Warnings While Using This Medicine: · Tell your doctor if you are pregnant or breastfeeding, or if you have kidney disease, liver disease, lung or breathing problems, gallbladder or pancreas problems, an underactive thyroid, Jonathan disease, prostate problems, trouble urinating, stomach problems, or a history of head injury or brain tumor, seizures, alcohol or drug addiction. · This medicine may cause the following problems: ¨ High risk of overdose, which can lead to death ¨ Respiratory depression (serious breathing problem that can be life-threatening) ¨ Liver problems ¨ Serious skin reactions ¨ Serotonin syndrome (when used with certain medicines) · This medicine can be habit-forming. Do not use more than your prescribed dose. Call your doctor if you think your medicine is not working. · This medicine may make you dizzy or drowsy. Do not drive or doing anything else that could be dangerous until you know how this medicine affects you. · This medicine contains acetaminophen. Read the labels of all other medicines you are using to see if they also contain acetaminophen, or ask your doctor or pharmacist. Tj Griffin not use more than 4 grams (4,000 milligrams) total of acetaminophen in one day. · Tell any doctor or dentist who treats you that you are using this medicine. This medicine may affect certain medical test results. · This medicine may cause constipation, especially with long-term use. Ask your doctor if you should use a laxative to prevent and treat constipation. · This medicine could cause infertility. Talk with your doctor before using this medicine if you plan to have children. · Keep all medicine out of the reach of children. Never share your medicine with anyone. Possible Side Effects While Using This Medicine:  
Call your doctor right away if you notice any of these side effects: · Allergic reaction: Itching or hives, swelling in your face or hands, swelling or tingling in your mouth or throat, chest tightness, trouble breathing · Anxiety, restlessness, fast heartbeat, fever, sweating, muscle spasms, twitching, diarrhea, seeing or hearing things that are not there · Blistering, peeling, red skin rash · Blue lips, fingernails, or skin · Dark urine or pale stools, loss of appetite, nausea or vomiting, stomach pain, yellow skin or eyes · Extreme weakness, shallow breathing, slow heartbeat, sweating, seizures, cold or clammy skin · Lightheadedness, dizziness, fainting If you notice these less serious side effects, talk with your doctor: · Constipation, nausea, vomiting · Tiredness or sleepiness If you notice other side effects that you think are caused by this medicine, tell your doctor. Call your doctor for medical advice about side effects. You may report side effects to FDA at 5-229-FDA-6529 © 2017 [address] Information is for End User's use only and may not be sold, redistributed or otherwise used for commercial purposes. The above information is an  only. It is not intended as medical advice for individual conditions or treatments. Talk to your doctor, nurse or pharmacist before following any medical regimen to see if it is safe and effective for you. Please provide this summary of care documentation to your next provider. Signatures-by signing, you are acknowledging that this After Visit Summary has been reviewed with you and you have received a copy. Patient Signature:  ____________________________________________________________ Date:  ____________________________________________________________  
  
Eric Milian Provider Signature:  ____________________________________________________________ Date:  ____________________________________________________________

## 2017-11-20 NOTE — BRIEF OP NOTE
BRIEF OPERATIVE NOTE    Date of Procedure: 11/20/2017   Preoperative Diagnosis: z98  t85.598a band erosion  Postoperative Diagnosis: z98  t85.598a band erosion    Procedure(s):  band removal  Surgeon(s) and Role:     * Lili Coughlin MD - Primary         Assistant Staff:       Surgical Staff:  Circ-1: Altagracia Mckee RN  Circ-Relief: Yolette Aguilar  Scrub Tech-1: Sharmine Dixon  Surg Asst-1: Eneida Carlin  Event Time In   Incision Start 1527   Incision Close 1630     Anesthesia: General   Estimated Blood Loss: 25 ml  Specimens:   ID Type Source Tests Collected by Time Destination   1 : Lap Band and Components Preservative   Lili Coughlin MD 11/20/2017 1557 Pathology      Findings: eroded gastric band removed via anterior gastrotomy   Complications: none  IVF: 800 ml  UOP: 75 ml  Implants: * No implants in log *    [id number]

## 2017-11-21 ENCOUNTER — APPOINTMENT (OUTPATIENT)
Dept: GENERAL RADIOLOGY | Age: 57
End: 2017-11-21
Attending: SURGERY
Payer: COMMERCIAL

## 2017-11-21 VITALS
BODY MASS INDEX: 30.9 KG/M2 | TEMPERATURE: 99.1 F | HEART RATE: 101 BPM | WEIGHT: 181 LBS | RESPIRATION RATE: 16 BRPM | OXYGEN SATURATION: 94 % | DIASTOLIC BLOOD PRESSURE: 60 MMHG | HEIGHT: 64 IN | SYSTOLIC BLOOD PRESSURE: 98 MMHG

## 2017-11-21 LAB
ANION GAP SERPL CALC-SCNC: 8 MMOL/L (ref 3–18)
BUN SERPL-MCNC: 11 MG/DL (ref 7–18)
BUN/CREAT SERPL: 19 (ref 12–20)
CALCIUM SERPL-MCNC: 8.6 MG/DL (ref 8.5–10.1)
CHLORIDE SERPL-SCNC: 107 MMOL/L (ref 100–108)
CO2 SERPL-SCNC: 25 MMOL/L (ref 21–32)
CREAT SERPL-MCNC: 0.57 MG/DL (ref 0.6–1.3)
ERYTHROCYTE [DISTWIDTH] IN BLOOD BY AUTOMATED COUNT: 15.7 % (ref 11.6–14.5)
GLUCOSE SERPL-MCNC: 113 MG/DL (ref 74–99)
HCT VFR BLD AUTO: 31.7 % (ref 35–45)
HGB BLD-MCNC: 9.8 G/DL (ref 12–16)
MCH RBC QN AUTO: 25.1 PG (ref 24–34)
MCHC RBC AUTO-ENTMCNC: 30.9 G/DL (ref 31–37)
MCV RBC AUTO: 81.1 FL (ref 74–97)
PLATELET # BLD AUTO: 455 K/UL (ref 135–420)
PMV BLD AUTO: 10.1 FL (ref 9.2–11.8)
POTASSIUM SERPL-SCNC: 4.1 MMOL/L (ref 3.5–5.5)
RBC # BLD AUTO: 3.91 M/UL (ref 4.2–5.3)
SODIUM SERPL-SCNC: 140 MMOL/L (ref 136–145)
WBC # BLD AUTO: 16.5 K/UL (ref 4.6–13.2)

## 2017-11-21 PROCEDURE — 77030020255 HC SOL INJ LR 1000ML BG

## 2017-11-21 PROCEDURE — 96375 TX/PRO/DX INJ NEW DRUG ADDON: CPT

## 2017-11-21 PROCEDURE — 96372 THER/PROPH/DIAG INJ SC/IM: CPT

## 2017-11-21 PROCEDURE — 74240 X-RAY XM UPR GI TRC 1CNTRST: CPT

## 2017-11-21 PROCEDURE — 85027 COMPLETE CBC AUTOMATED: CPT | Performed by: SURGERY

## 2017-11-21 PROCEDURE — 96361 HYDRATE IV INFUSION ADD-ON: CPT

## 2017-11-21 PROCEDURE — 74011250637 HC RX REV CODE- 250/637: Performed by: SURGERY

## 2017-11-21 PROCEDURE — 80048 BASIC METABOLIC PNL TOTAL CA: CPT | Performed by: SURGERY

## 2017-11-21 PROCEDURE — 77010033678 HC OXYGEN DAILY

## 2017-11-21 PROCEDURE — 74011250636 HC RX REV CODE- 250/636: Performed by: SURGERY

## 2017-11-21 PROCEDURE — 99218 HC RM OBSERVATION: CPT

## 2017-11-21 PROCEDURE — 96376 TX/PRO/DX INJ SAME DRUG ADON: CPT

## 2017-11-21 PROCEDURE — 74011636320 HC RX REV CODE- 636/320: Performed by: SURGERY

## 2017-11-21 PROCEDURE — 36415 COLL VENOUS BLD VENIPUNCTURE: CPT | Performed by: SURGERY

## 2017-11-21 RX ORDER — OXYCODONE HYDROCHLORIDE 5 MG/1
5-10 TABLET ORAL
Qty: 15 TAB | Refills: 0 | Status: SHIPPED | OUTPATIENT
Start: 2017-11-21 | End: 2017-11-21

## 2017-11-21 RX ORDER — HYDROCODONE BITARTRATE AND ACETAMINOPHEN 7.5; 325 MG/15ML; MG/15ML
15-30 SOLUTION ORAL
Qty: 473 ML | Refills: 0 | Status: SHIPPED | OUTPATIENT
Start: 2017-11-21 | End: 2017-12-28

## 2017-11-21 RX ADMIN — KETOROLAC TROMETHAMINE 30 MG: 30 INJECTION, SOLUTION INTRAMUSCULAR at 13:32

## 2017-11-21 RX ADMIN — KETOROLAC TROMETHAMINE 30 MG: 30 INJECTION, SOLUTION INTRAMUSCULAR at 02:28

## 2017-11-21 RX ADMIN — IOHEXOL 150 ML: 240 INJECTION, SOLUTION INTRATHECAL; INTRAVASCULAR; INTRAVENOUS; ORAL at 08:22

## 2017-11-21 RX ADMIN — SODIUM CHLORIDE, SODIUM LACTATE, POTASSIUM CHLORIDE, AND CALCIUM CHLORIDE 150 ML/HR: 600; 310; 30; 20 INJECTION, SOLUTION INTRAVENOUS at 09:54

## 2017-11-21 RX ADMIN — OXYCODONE HYDROCHLORIDE 10 MG: 5 TABLET ORAL at 16:38

## 2017-11-21 RX ADMIN — SODIUM CHLORIDE, SODIUM LACTATE, POTASSIUM CHLORIDE, AND CALCIUM CHLORIDE 150 ML/HR: 600; 310; 30; 20 INJECTION, SOLUTION INTRAVENOUS at 16:33

## 2017-11-21 RX ADMIN — ENOXAPARIN SODIUM 40 MG: 40 INJECTION SUBCUTANEOUS at 12:46

## 2017-11-21 RX ADMIN — PANTOPRAZOLE SODIUM 40 MG: 40 GRANULE, DELAYED RELEASE ORAL at 09:54

## 2017-11-21 RX ADMIN — Medication 1 MG: at 03:49

## 2017-11-21 RX ADMIN — KETOROLAC TROMETHAMINE 30 MG: 30 INJECTION, SOLUTION INTRAMUSCULAR at 09:54

## 2017-11-21 NOTE — PROGRESS NOTES
Problem: Falls - Risk of  Goal: *Absence of Falls  Document Osman Fall Risk and appropriate interventions in the flowsheet.    Outcome: Progressing Towards Goal  Fall Risk Interventions:            Medication Interventions: Patient to call before getting OOB

## 2017-11-21 NOTE — PROGRESS NOTES
Surgery Progress Note    11/21/2017    Admit Date: 11/20/2017    Subjective:     Patient has complaints of nothing Pain is controlled with current regimen. Patient has been ambulating in halls. She reports no nausea and no vomiting and is tolerating ice chips well. Objective:     Blood pressure 98/60, pulse (!) 101, temperature 99.1 °F (37.3 °C), resp. rate 16, height 5' 4\" (1.626 m), weight 82.1 kg (181 lb), SpO2 94 %. 11/21 0701 - 11/21 1900  In: 153 [P.O.:153]  Out: 500 [Urine:500]    11/19 1901 - 11/21 0700  In: 800 [I.V.:800]  Out: 1050 [Urine:1025]    EXAM: GENERAL: alert, pleasant, no distress   HEART: regular rate and rhythm   LUNGS: clear to auscultation   ABDOMEN:  Soft, obese, appropriately tender, non distended, incisions clean,  dry, no erythema or drainage   EXTREMITIES: warm, well perfused    Data Review    Recent Results (from the past 24 hour(s))   CBC W/O DIFF    Collection Time: 11/21/17  4:05 AM   Result Value Ref Range    WBC 16.5 (H) 4.6 - 13.2 K/uL    RBC 3.91 (L) 4.20 - 5.30 M/uL    HGB 9.8 (L) 12.0 - 16.0 g/dL    HCT 31.7 (L) 35.0 - 45.0 %    MCV 81.1 74.0 - 97.0 FL    MCH 25.1 24.0 - 34.0 PG    MCHC 30.9 (L) 31.0 - 37.0 g/dL    RDW 15.7 (H) 11.6 - 14.5 %    PLATELET 795 (H) 919 - 420 K/uL    MPV 10.1 9.2 - 37.0 FL   METABOLIC PANEL, BASIC    Collection Time: 11/21/17  4:05 AM   Result Value Ref Range    Sodium 140 136 - 145 mmol/L    Potassium 4.1 3.5 - 5.5 mmol/L    Chloride 107 100 - 108 mmol/L    CO2 25 21 - 32 mmol/L    Anion gap 8 3.0 - 18 mmol/L    Glucose 113 (H) 74 - 99 mg/dL    BUN 11 7.0 - 18 MG/DL    Creatinine 0.57 (L) 0.6 - 1.3 MG/DL    BUN/Creatinine ratio 19 12 - 20      GFR est AA >60 >60 ml/min/1.73m2    GFR est non-AA >60 >60 ml/min/1.73m2    Calcium 8.6 8.5 - 10.1 MG/DL     UGI - submucosal tunnel visible but no leak or obstruction    Assessment:   Kayla Parent is a 56 y.o. female, postop day 1 status post removal of eroded lap band.   Condition: good    Plan: -D/C telemetry  -Ambulate every four hours  -Oxycodone 5mg 1-2 tabs po every 4-6 hour prn pain uncontrolled by tylenol  -Advance to Clear liquid Gastric Bypass Diet, if able to tolerate diet, will discharge home later today

## 2017-11-21 NOTE — PROGRESS NOTES
Bedside shift change report given to Flores Rothman   (oncoming nurse) by Racquel Forde RN (offgoing nurse). Report included the following information SBAR, Kardex, OR Summary, Intake/Output and MAR.     6453: Patient ambulated in starr, NAD noted. 1113: Patient resting in bed, NAD noted, denies nausea. Tolerating clear liquids. ICS encouraged, SCDs reapplied. 1200: Patient walking in starr, NAD noted, denies increase in pain. 1242: Patient resting in bed, declines pain medication. ICS demonstrated. Denies nausea. 1332: Patient resting in bed. 1440: Patient ambulating in starr, NAD noted. 1740: Patient tolerating full liquid diet, NAD noted, family at bedside. Patient discharged to home, arm bands cut off and shredded.

## 2017-11-21 NOTE — ACP (ADVANCE CARE PLANNING)
Patient has designated __her mother______________________ to participate in his/her discharge plan and to receive any needed information.      Name: Gentry Blanchard  Address:  [address] [place] [address] [address] [address]  Phone number:  [phone]

## 2017-11-21 NOTE — PROGRESS NOTES
Problem: Falls - Risk of  Goal: *Absence of Falls  Document Osman Fall Risk and appropriate interventions in the flowsheet.    Outcome: Progressing Towards Goal  Fall Risk Interventions:            Medication Interventions: Patient to call before getting OOB, Teach patient to arise slowly    Elimination Interventions: Call light in reach, Patient to call for help with toileting needs, Toileting schedule/hourly rounds

## 2017-11-21 NOTE — OP NOTES
Gordon Collazo    Name:  Kalyan Rosenberg  MR#:  [id number]  :  1960  Account #:  [de-identified]  Date of Adm:  2017  Date of Surgery:  2017      PREOPERATIVE DIAGNOSIS: Laparoscopic gastric band erosion with  a completely intragastric band. POSTOPERATIVE DIAGNOSIS: Laparoscopic gastric band erosion  with a completely intragastric band. PROCEDURES PERFORMED: Laparoscopic removal of eroded Lap-  Band via anterior gastrotomy along with port and tubing. SURGEON: Du Durbin. Charles Swann MD    ASSISTANT: Julien Galarza SA    INCISION START: 8554. INCISION CLOSE: 1308. ANESTHESIA: General endotracheal anesthesia. ESTIMATED BLOOD LOSS: 25 mL. SPECIMENS REMOVED: Lap-Band and components. FINDINGS: Eroded gastric band was noted to be tethered only by its  tubing, this was removed in entirety via the anterior gastrotomy. The  tubing was completely encased with adhesions from omentum, some  of these adhesions were taken down to allow the tubing to be fully  removed as well. COMPLICATIONS: None. INTRAVENOUS FLUIDS: 800 mL. URINE OUTPUT: 75 mL. IMPLANTS: None. DESCRIPTION OF PROCEDURE: The patient was prepped and  draped in standard sterile fashion after being placed under general  anesthetic. The site of the previous port in the left upper quadrant was  incised, an incision was made and carried down to the port in the port  pocket. The port was dissected out of the surrounding tissues and  elevated up, dividing its sutures with scissors. The port was then  elevated up and the tubing was brought out along with the port, and  just proximal to the port tubing connection the port was transected  allowing the tubing going to the band to retract back in the abdominal  cavity. The port and its cut portion of the tubing were passed off the  field as specimen. Attention was directed to the abdominal cavity proper.  A site was  selected in the midline superior to the umbilicus. This area was  anesthetized with 0.5% Marcaine and 1% lidocaine and incised. A  [employer] 5 mm optical trocar was placed with the laparoscope and  directed into the abdominal cavity using Optiview technique. Abdomen  was insufflated to 15 mmHg and surveyed, there was no evidence of  injury from the entries. On survey, there was noted to be adhesions  from the area of the band insertion point to the anterior abdominal wall. Additional trocars were then placed. A 5-mm trocar was placed in the  left upper quadrant approximately 2 fingerbreadths below the costal  margin, a 12 mm trocar was placed in the lateral portion of the left  rectus sheath approximately 3 fingerbreadths lateral to the  supraumbilical trocar and another 12 mm trocar was placed in the  lateral portion of the right rectus sheath approximately 4 fingerbreadths  superior and lateral to the umbilical trocar. All were placed after  anesthetizing the sites with local anesthetic. All were placed after  incising skin with scalpel and placed using blunt dissecting technique,  there was no evidence of injury from the entries. Attention was directed to the adhesions which were taken down under  direct vision using Harmonic scalpel, this exposed the band tubing. Band tubing was left lying within the remainder of the adhesions. Attention was then directed to the stomach. The band could be  palpated in the cardia and with the band palpated and identified, an  anterior gastrotomy was made with Harmonic scalpel approximately 4  cm in length. This allowed entry into the stomach. Upon entering the  stomach, the band was identified. The band was partially tethered on  the lesser curve by some mucosa and then the band tubing was  appearing to be exiting from the posterior portion of the gastric cardia. The band buckle was divided and its front wall was removed and  passed off the field as specimen.  This allowed the band to fully  unbuckle and the portion going through the mucosa was brought  through the mucosa with direct traction without difficulty and without  injury to the mucosa, and that area was otherwise left undisturbed. Then, the band was elevated up on the tubing. A site was selected on  the tubing where it entered the mucosa which was tethered by the one  way flange of the tape on the tapering portion of the band and the band  was divided at this point. The band was then placed in an Endobag and  brought out through the left rectus sheath trocar site and passed off the  field as specimen. The tip of the gastric tubing was grasped and attempted to retract the  entire tubing in total, it broke in its midportion, the portion of that had  been grasped was passed off the field as specimen. Dissection was  then taken down to the broken portion and this dissection was carried  around the spleen until we entered the densest part of the adhesions. Upon approaching the densest part of the adhesions which were to the  left hemidiaphragm, traction was placed on the band tubing and the  entire remainder of the band tubing came out including the flange as  described above. This was passed off the field as specimen. The stomach was examined once again, there was no evidence of  bleeding or other problems in the anterior of the stomach and the  gastrotomy was closed using 3-0 Vicryl running inverting inner layer. Then, a second outer layer was placed of 3-0 Vicryl in a running  Lembert fashion for the second layer closure of the gastrotomy. Attention was directed to the area on the left hemidiaphragm where the  tubing had been pulled free. This opening was closed with a figure-of-  eight suture of 3-0 Vicryl and then a piece of omentum was tacked up  across this opening for effectively a Irean Poser patch type repair of this  tubing channel. No additional pathology was noted. Upper abdomen was irrigated  clean.  No evidence of other spillage was identified. The abdomen was  desufflated from the trocars and the trocars were removed. The port  site was closed with 3-0 Vicryl on Pradip's fascia and a running 4-0  Monocryl on the skin, and then the port sites were closed with 4-0  Monocryl subcuticular closures and all were dressed with Dermabond. The patient awakened and taken to the recovery room having tolerated  the procedure well.         MD TERRA Rich / YOBANY  D:  11/20/2017   16:45  T:  11/21/2017   00:19  Job #:  [id number]

## 2017-11-21 NOTE — PROGRESS NOTES
Patient awake this am. Some c/o pain and this is controlled with medication. Swallow study this am and will proceed as per Dr Boyd orders.

## 2017-11-21 NOTE — PROGRESS NOTES
Patient and/or next of kin has been given the Outpatient Observation Information and Notification letter and all questions answered. Vonda Wilson RN,ext. [phone].

## 2017-11-21 NOTE — PROGRESS NOTES
TRANSFER - IN REPORT:    Verbal report received from Contreras on Ania Sagastume  being received from [place] for routine post - op      Report consisted of patients Situation, Background, Assessment and   Recommendations(SBAR). Information from the following report(s) SBAR, OR Summary and Intake/Output was reviewed with the receiving nurse. Opportunity for questions and clarification was provided. Assessment completed upon patients arrival to unit and care assumed. Received pt via stretcher awake and alert. Ambulate to BR where pt voided 50cc clear yellow urine. Tolerated well. Lap sites to abdomen D/I. Oriented to call bell, phone and IS with pt giving return demonstration. Mother at [facility].

## 2017-11-21 NOTE — PROGRESS NOTES
Care Management Interventions  PCP Verified by CM: Yes  Mode of Transport at Discharge:  Other (see comment) (family)  Transition of Care Consult (CM Consult): Discharge Planning  Discharge Durable Medical Equipment: No  Physical Therapy Consult: No  Occupational Therapy Consult: No  Speech Therapy Consult: No  Current Support Network: Relative's Home (lives with her mother)  Confirm Follow Up Transport: Self  Plan discussed with Pt/Family/Caregiver: Yes  Discharge Location  Discharge Placement: Home

## 2017-11-21 NOTE — ROUTINE PROCESS
Bedside and Verbal shift change report given to Ozzy Cobb (oncoming nurse) by Jai Mcintyre RN (offgoing nurse). Report included the following information SBAR, Kardex and MAR.

## 2017-11-21 NOTE — ROUTINE PROCESS
1935-The patient was admitted to the floor in stable condition. Alert and oriented x 4. Stable condition. VSS. The patient was encouraged to use her ICS. Call bell within reach. 2032-The patient abdomen is soft. Lap sites are clean, dry and intact. Hypoactive bowel sounds. The patient is voiding. The patient has no complaints. The patient is in stable condition. The patient is ambulating to the bathroom. 0012-Bladder scanned patient due to her voiding only small amounts. The patient voided 50 cc and then 150 cc. Bladder scanned patient, post voidal scan= 54 cc.    0220-The patient is sleeping in bed. No complaints. 0400-The patient is ambulating to the bathroom. VSS.    0635-The patient ambulated around the hallway.

## 2017-11-21 NOTE — PROGRESS NOTES
conducted an initial consultation and Spiritual Assessment for Mikey Davis, who is a 56 y.o.,female. Patients Primary Language is: [place]. According to the patients EMR Cheondoism Affiliation is: Oriental orthodox. The reason the Patient came to the hospital is:   Patient Active Problem List    Diagnosis Date Noted    Morbid obesity ([facility] 75/48/8502    Complication of gastric banding 11/20/2017    H/O laparoscopic adjustable gastric banding 10/06/2017    Erosion of gastric band 10/06/2017    [facility] 10/06/2017        The  provided the following Interventions:  Initiated a relationship of care and support. Explored issues of agustin, spirituality and/or Druze needs while hospitalized. Listened empathically. Provided chaplaincy education. Provided information about Spiritual Care Services. Offered prayer and assurance of continued prayers on patient's behalf. Chart reviewed. The following outcomes were achieved:  Patient shared some information about their medical narrative and spiritual journey/beliefs. Patient processed feeling about current hospitalization. Patient expressed gratitude for the 's visit. Assessment:  Patient did not indicate any spiritual or Druze issues which require Spiritual Care Services interventions at this time. Patient does not have any Druze/cultural needs that will affect patients preferences in health care. Plan:  Chaplains will continue to follow and will provide pastoral care on an as needed or requested basis.  recommends bedside caregivers page  on duty if patient shows signs of acute spiritual or emotional distress.     [address] [facility]   Staff [address] [facility]   [phone]

## 2017-11-21 NOTE — DISCHARGE INSTRUCTIONS
DISCHARGE SUMMARY from Nurse    PATIENT INSTRUCTIONS:    After general anesthesia or intravenous sedation, for 24 hours or while taking prescription Narcotics:  · Limit your activities  · Do not drive and operate hazardous machinery  · Do not make important personal or business decisions  · Do  not drink alcoholic beverages  · If you have not urinated within 8 hours after discharge, please contact your surgeon on call. Report the following to your surgeon:  · Excessive pain, swelling, redness or odor of or around the surgical area  · Temperature over 100.5  · Nausea and vomiting lasting longer than 4 hours or if unable to take medications  · Any signs of decreased circulation or nerve impairment to extremity: change in color, persistent  numbness, tingling, coldness or increase pain  · Any questions    What to do at Home:  Recommended activity: No lifting, Driving, or Strenuous exercise until cleared by surgeon. If you experience any of the following symptoms severe pain, nausea and vomiting, fever above 100.5, bleeding or drainage from incision, shortness of breath, please follow up with Dr. Asha Thompson. *  Please give a list of your current medications to your Primary Care Provider. *  Please update this list whenever your medications are discontinued, doses are      changed, or new medications (including over-the-counter products) are added. *  Please carry medication information at all times in case of emergency situations. These are general instructions for a healthy lifestyle:    No smoking/ No tobacco products/ Avoid exposure to second hand smoke  Surgeon General's Warning:  Quitting smoking now greatly reduces serious risk to your health.     Obesity, smoking, and sedentary lifestyle greatly increases your risk for illness    A healthy diet, regular physical exercise & weight monitoring are important for maintaining a healthy lifestyle    You may be retaining fluid if you have a history of heart failure or if you experience any of the following symptoms:  Weight gain of 3 pounds or more overnight or 5 pounds in a week, increased swelling in our hands or feet or shortness of breath while lying flat in bed. Please call your doctor as soon as you notice any of these symptoms; do not wait until your next office visit. Recognize signs and symptoms of STROKE:    F-face looks uneven    A-arms unable to move or move unevenly    S-speech slurred or non-existent    T-time-call 911 as soon as signs and symptoms begin-DO NOT go       Back to bed or wait to see if you get better-TIME IS BRAIN. Warning Signs of HEART ATTACK     Call 911 if you have these symptoms:   Chest discomfort. Most heart attacks involve discomfort in the center of the chest that lasts more than a few minutes, or that goes away and comes back. It can feel like uncomfortable pressure, squeezing, fullness, or pain.  Discomfort in other areas of the upper body. Symptoms can include pain or discomfort in one or both arms, the back, neck, jaw, or stomach.  Shortness of breath with or without chest discomfort.  Other signs may include breaking out in a cold sweat, nausea, or lightheadedness. Don't wait more than five minutes to call 911 - MINUTES MATTER! Fast action can save your life. Calling 911 is almost always the fastest way to get lifesaving treatment. Emergency Medical Services staff can begin treatment when they arrive -- up to an hour sooner than if someone gets to the hospital by car. The discharge information has been reviewed with the patient. The patient verbalized understanding. Discharge medications reviewed with the patient and appropriate educational materials and side effects teaching were provided. Patient armband removed and shredded.   ___________________________________________________________________________________________________________________________________

## 2017-11-21 NOTE — PROGRESS NOTES
[facility]   Discharge Planning/ Assessment    Reasons for Intervention: Chart reviewed. Met with pt., verified all demographics. \[facility]\"" has Matt ins. \[facility]\"" Dr. Jea Dan is her PCP. NOK: Shorty Patton, mother, with whom she lives with & designates can participate in her discharge process. Uses no DME. Independent with ADL's prior to admit. PLAN: home. Available as needed. Vonda Wilson RN,ext. [phone].       High Risk Criteria  [x] Yes  []No   Physician Referral  [] Yes  [x]No        Date    Nursing Referral  [] Yes  [x]No        Date    Patient/Family Request  [] Yes  [x]No        Date       Resources:    Medicare  [] Yes  [x]No   Medicaid  [] Yes  [x]No   No Resources  [] Yes  [x]No   Private Insurance  [x] Yes  []No    Name/Phone Number    Other  [] Yes  [x]No        (i.e. Workman's Comp)         Prior Services:    Prior Services  [] Yes  [x]No   Home Health  [] Yes  [x]No   [facility]  [] Yes  [x]No        Number of [address] Program  [] Yes  [x]No       Meals on Wheels  [] Yes  [x]No   Office on Aging  [] Yes  [x]No   Transportation Services  [] Yes  [x]No   Nursing Home  [] Yes  [x]No        Nursing Home Name    [facility]  [] Yes  [x]No        [address] Name    Other       Information Source:      Information obtained from  [x] Patient  [] Parent   [] Dee Dee Forester  [] Child  [] Spouse   [] Significant Other/Partner   [] Friend      [] EMS    [] Nursing Home Chart          [] Other:   Chart Review  [x] Yes  []No     Family/Support System:    Patient lives with  [] Alone    [] Spouse   [] Significant Other  [] Children  [] Caretaker   [x] Parent  [] Sibling     [] Other       Other Support System:    Is the patient responsible for care of others  [] Yes  [x]No   Information of person caring for patient on  discharge    Managers financial affairs independently  [x] Yes  []No   If no, explain:      Status Prior to Admission:    Mental Status  [x] Awake  [x] Alert  [x] Oriented  [x] Quiet/Calm [] Lethargic/Sedated   [] Disoriented  [] Restless/Anxious  [] Combative   Personal Care  [] Dependent  [x] Independent Personal Care  [] Requires Assistance   Meal Preparation Ability  [x] Independent   [] Standby Assistance   [] Minimal Assistance   [] Moderate Assistance  [] Maximum Assistance     [] Total Assistance   Chores  [x] Independent with Chores   [] N/A Nursing Home Resident   [] Requires Assistance   Bowel/Bladder  [x] Continent  [] Catheter  [] Incontinent  [] Ostomy Self-Care    [] Urine Diversion Self-Care  [] Maximum Assistance     [] Total Assistance   Number of Persons needed for assistance    DME at home  [] Funmi Hargrove  [] Sury Hargrove   [] Commode    [] Bathroom/Grab Bars  [] Hospital Bed  [] Nebulizer  [] Oxygen           [] Raised Toilet Seat  [] Shower Chair  [] Side Rails for Bed   [] Tub Transfer Bench   [] Ravinder Cirilo  [] Aliza Aguilar Standard      [] Other:   Vendor      Treatment Presently Receiving:    Current Treatments  [] Chemotherapy  [] Dialysis  [] Insulin  [] IVAB [x] IVF   [] O2  [] PCA   [] PT   [] RT   [] Tube Feedings   [] Wound Care     Psychosocial Evaluation:    Verbalized Knowledge of Disease Process  [] Patient  []Family   Coping with Disease Process  [] Patient  []Family   Requires Further Counseling Coping with Disease Process  [] Patient  []Family     Identified Projected Needs:    Home Health Aid  [] Yes  [x]No   Transportation  [] Yes  [x]No   Education  [] Yes  [x]No        Specific Education     Financial Counseling  [] Yes  [x]No   Inability to Care for Self/Will Require 24 hour care  [] Yes  [x]No   Pain Management  [] Yes  [x]No   Home Infusion Therapy  [] Yes  [x]No   Oxygen Therapy  [] Yes  [x]No   DME  [] Yes  [x]No   Long Term Care Placement  [] Yes  [x]No   Rehab  [] Yes  [x]No   Physical Therapy  [] Yes  [x]No   Needs Anticipated At This Time  [] Yes  [x]No     Intra-Hospital Referral:    Home Health Liasion  [] Yes  [x]No     [] Yes  [x]No   Patient Representative  [] Yes  [x]No   Staff for Teaching Needs  [] Yes  [x]No   Specialty Teaching Needs     Diabetic Educator  [] Yes  [x]No   Referral for Diabetic Educator Needed  [] Yes  [x]No  If Yes, place order for Nutritionist or Diabetic Consult     Tentative Discharge Plan:    Home with No Services  [x] Yes  []No   Home with [address]  [] Yes  [x]No        If Yes, specify type    Home Care Program  [] Yes  [x]No        If Yes, specify type    Meals on Wheels  [] Yes  [x]No   Office of Aging  [] Yes  [x]No   NHP  [] Yes  [x]No   Return to the Nursing Home  [] Yes  [x]No   Rehab Therapy  [] Yes  [x]No   Acute Rehab  [] Yes  [x]No   Subacute Rehab  [] Yes  [x]No   Private Care  [] Yes  [x]No   Substance Abuse Referral  [] Yes  [x]No   Transportation  [] Yes  [x]No   Chore Service  [] Yes  [x]No   Inpatient Hospice  [] Yes  [x]No   OP RT  [] Yes  [x] No   OP Hemo  [] Yes  [x] No   OP PT  [] Yes  [x]No   Support Group  [] Yes  [x]No   Reach to Recovery  [] Yes  [x]No   OP Oncology Clinic  [] Yes  [x]No   Clinic Appointment  [] Yes  [x]No   DME  [] Yes  [x]No   Comments    Name of D/C Planner or  Given to Patient or Family Gus Rossi   Phone Number Pager: [phone]  [phone]   Date 11-   Time    If you are discharged home, whom do you designate to participate in your discharge plan and receive any information needed?      Enter name of [address]        Phone # of [facility] [phone]        Address of [address] [address]        Updated         Patient refused to designate any           individual

## 2017-12-07 ENCOUNTER — OFFICE VISIT (OUTPATIENT)
Dept: SURGERY | Age: 57
End: 2017-12-07

## 2017-12-07 VITALS
WEIGHT: 176 LBS | DIASTOLIC BLOOD PRESSURE: 73 MMHG | HEIGHT: 64 IN | TEMPERATURE: 96.3 F | HEART RATE: 103 BPM | SYSTOLIC BLOOD PRESSURE: 149 MMHG | BODY MASS INDEX: 30.05 KG/M2

## 2017-12-07 DIAGNOSIS — Z09 POSTOPERATIVE EXAMINATION: Primary | ICD-10-CM

## 2017-12-07 NOTE — MR AVS SNAPSHOT
Visit Information Date & Time Provider Department Dept. Phone Encounter #  
 12/7/2017 11:45 AM MD [facility] [phone] [id number] Upcoming Health Maintenance Date Due Hepatitis C Screening 1960 DTaP/Tdap/Td series (1 - Tdap) 1/18/1983 PAP AKA CERVICAL CYTOLOGY 1/18/1983 BREAST CANCER SCRN MAMMOGRAM 1/18/2012 FOBT Q 1 YEAR AGE 50-75 1/18/2012 Allergies as of 12/7/2017  Review Complete On: 12/7/2017 By: Cheyenne Bhatti No Known Allergies Current Immunizations  Never Reviewed Name Date Influenza Vaccine 9/30/2017 Not reviewed this visit Vitals BP Pulse Temp Height(growth percentile) Weight(growth percentile) BMI  
 149/73 (BP 1 Location: Left arm, BP Patient Position: At rest) (!) 103 96.3 °F (35.7 °C) (Oral) 5' 4\" (1.626 m) 176 lb (79.8 kg) 30.21 kg/m2 OB Status Smoking Status Menopause Never Smoker BMI and BSA Data Body Mass Index Body Surface Area  
 30.21 kg/m 2 1.9 m 2 Your Updated Medication List  
  
   
This list is accurate as of: 12/7/17 12:24 PM.  Always use your most recent med list.  
  
  
  
  
 HYDROcodone-acetaminophen 0.5-21.7 mg/mL oral solution Commonly known as:  HYCET Take 15-30 mL by mouth four (4) times daily as needed for Pain. Max Daily Amount: 60 mg. Introducing [facility]! Dakotah Cao introduces kwiry patient portal. Now you can access parts of your medical record, email your doctor's office, and request medication refills online. 1. In your internet browser, go to [email or web address] 2. Click on the First Time User? Click Here link in the Sign In box. You will see the New Member Sign Up page. 3. Enter your kwiry Access Code exactly as it appears below. You will not need to use this code after youve completed the sign-up process.  If you do not sign up before the expiration date, you must request a new code. · [employer] Access Code: [email or web address] Expires: 12/8/2017 11:42 AM 
 
4. Enter the last four digits of your Social Security Number (xxxx) and Date of Birth (mm/dd/yyyy) as indicated and click Submit. You will be taken to the next sign-up page. 5. Create a [employer] ID. This will be your [employer] login ID and cannot be changed, so think of one that is secure and easy to remember. 6. Create a [employer] password. You can change your password at any time. 7. Enter your Password Reset Question and Answer. This can be used at a later time if you forget your password. 8. Enter your e-mail address. You will receive e-mail notification when new information is available in [address] 9. Click Sign Up. You can now view and download portions of your medical record. 10. Click the Download Summary menu link to download a portable copy of your medical information. If you have questions, please visit the Frequently Asked Questions section of the [employer] website. Remember, [employer] is NOT to be used for urgent needs. For medical emergencies, dial 911. Now available from your iPhone and Android! Please provide this summary of care documentation to your next provider. Your primary care clinician is listed as Chercatracho Soliman. If you have any questions after today's visit, please call [phone].

## 2017-12-07 NOTE — PROGRESS NOTES
Anshu Levin is a 56 y.o. female who presents today with   Chief Complaint   Patient presents with    Surgical Follow-up      Laparoscopic removal of eroded Lap-Band via anterior gastrotomy along with port and tubing 11/20/2017                . Body mass index is 30.21 kg/(m^2). 1. Have you been to the ER, urgent care clinic since your last visit? Hospitalized since your last visit? No    2. Have you seen or consulted any other health care providers outside of the [address] [place] [address] since your last visit? Include any pap smears or colon screening.  No

## 2017-12-11 NOTE — PROGRESS NOTES
SUBJECTIVE: Kesha Hopson is a 56 y.o. female is seen for a routine postop check 2 weeks s/p lap gastrostomy for removal of eroded lap band. Reports no problems with the wound or other issues. Activity, diet and bowels are normal. No pain. Eating well    OBJECTIVE: Appears well. Wounds are well healed without complications or infection. ASSESSMENT: normal postoperative course, doing well. PLAN: resume normal activities.  See me once again in a month for final check

## 2017-12-27 PROBLEM — K81.0 ACUTE CHOLECYSTITIS: Status: ACTIVE | Noted: 2017-12-27

## 2018-01-08 ENCOUNTER — TELEPHONE (OUTPATIENT)
Dept: SURGERY | Age: 58
End: 2018-01-08

## 2018-01-08 NOTE — TELEPHONE ENCOUNTER
Per [facility] requirements; Phone call placed. Left message for patient to call for follow up appointment. Letter sent    Dear Belgica Ramesh,    Thank you for choosing [facility] for your care. You had surgery on November 20, 2017. We are interested in how you have been feeling since your surgery. The Department of Surgery at our hospital are members of the Metabolic and Bariatric Surgery Accreditation and Quality Improvement Program [facility]). We are gathering information on the outcomes of our patients after surgery. Please take a few minutes to answer the questions below and return this letter via mail or e-mail to [email or web address]. Your answers are confidential.    ¨ Have you been to a hospital or seen by a doctor for any reason since your surgery? Yes    No    If you answered NO, you do not need to answer any more questions. If you have answered YES, please answer the following questions (use back of page if additional space is needed). 1.  Have you been seen in an outpatient clinic or doctors office after your surgery? Yes    No    a. If yes, was this visit for routine follow-up? Yes    No    b. If no, what was the reason for your visit?       c.  Date(s) of visit(s):       d. Have you experienced any health problems since your surgery? Yes    No    e. If yes, please explain:          2. Did you go to an Emergency Department (ED) or hospital after your surgery? Yes    No    a. Were you admitted? Yes    No    b. If yes, please explain:       c.  Date(s) of ED visit or hospitalization:       d.  Did you have any additional surgery(ies) during this hospitalization? Yes    No    e. If yes, what type of surgery(ies) did you have?      f.  Date(s) of surgery(ies): Your health and feedback are important to us. We greatly appreciate your response. Thank you.     Sincerely,  [facility] [address] [facility]

## 2018-01-08 NOTE — TELEPHONE ENCOUNTER
Per MBSAQIP 30 day follow up:  Patient returned phone call. Denies reoperation, readmission, IVF or ER visits.

## 2018-07-19 ENCOUNTER — OFFICE VISIT (OUTPATIENT)
Dept: SURGERY | Age: 58
End: 2018-07-19

## 2018-07-19 VITALS
HEART RATE: 85 BPM | SYSTOLIC BLOOD PRESSURE: 150 MMHG | DIASTOLIC BLOOD PRESSURE: 78 MMHG | TEMPERATURE: 97.8 F | OXYGEN SATURATION: 98 % | HEIGHT: 64 IN | RESPIRATION RATE: 18 BRPM | WEIGHT: 209 LBS | BODY MASS INDEX: 35.68 KG/M2

## 2018-10-08 ENCOUNTER — OFFICE VISIT (OUTPATIENT)
Dept: SURGERY | Age: 58
End: 2018-10-08

## 2018-10-08 ENCOUNTER — HOSPITAL ENCOUNTER (OUTPATIENT)
Dept: PREADMISSION TESTING | Age: 58
Discharge: HOME | End: 2018-10-08
Payer: COMMERCIAL

## 2018-10-08 ENCOUNTER — HOSPITAL ENCOUNTER (OUTPATIENT)
Dept: LAB | Age: 58
Discharge: HOME | End: 2018-10-08
Payer: COMMERCIAL

## 2018-10-08 VITALS
BODY MASS INDEX: 37.56 KG/M2 | SYSTOLIC BLOOD PRESSURE: 139 MMHG | TEMPERATURE: 96.5 F | WEIGHT: 220 LBS | DIASTOLIC BLOOD PRESSURE: 77 MMHG | HEART RATE: 96 BPM | RESPIRATION RATE: 20 BRPM | HEIGHT: 64 IN

## 2018-10-08 DIAGNOSIS — E66.01 MORBID OBESITY (HCC): Primary | ICD-10-CM

## 2018-10-08 DIAGNOSIS — G47.33 OSA (OBSTRUCTIVE SLEEP APNEA): ICD-10-CM

## 2018-10-08 DIAGNOSIS — E66.01 MORBID OBESITY (HCC): ICD-10-CM

## 2018-10-08 LAB
ALBUMIN SERPL-MCNC: 3.5 G/DL (ref 3.4–5)
ANION GAP SERPL CALC-SCNC: 7 MMOL/L (ref 3–18)
BASOPHILS # BLD: 0.1 K/UL (ref 0–0.1)
BASOPHILS NFR BLD: 1 % (ref 0–2)
BUN SERPL-MCNC: 10 MG/DL (ref 7–18)
BUN/CREAT SERPL: 12 (ref 12–20)
CALCIUM SERPL-MCNC: 8.8 MG/DL (ref 8.5–10.1)
CHLORIDE SERPL-SCNC: 108 MMOL/L (ref 100–108)
CO2 SERPL-SCNC: 29 MMOL/L (ref 21–32)
CREAT SERPL-MCNC: 0.83 MG/DL (ref 0.6–1.3)
DIFFERENTIAL METHOD BLD: ABNORMAL
EOSINOPHIL # BLD: 0.2 K/UL (ref 0–0.4)
EOSINOPHIL NFR BLD: 3 % (ref 0–5)
ERYTHROCYTE [DISTWIDTH] IN BLOOD BY AUTOMATED COUNT: 14.7 % (ref 11.6–14.5)
FOLATE SERPL-MCNC: 17.1 NG/ML (ref 3.1–17.5)
GLUCOSE SERPL-MCNC: 91 MG/DL (ref 74–99)
HCT VFR BLD AUTO: 40.5 % (ref 35–45)
HGB BLD-MCNC: 12.8 G/DL (ref 12–16)
IRON SERPL-MCNC: 51 UG/DL (ref 50–175)
LYMPHOCYTES # BLD: 2.6 K/UL (ref 0.9–3.6)
LYMPHOCYTES NFR BLD: 44 % (ref 21–52)
MCH RBC QN AUTO: 27.6 PG (ref 24–34)
MCHC RBC AUTO-ENTMCNC: 31.6 G/DL (ref 31–37)
MCV RBC AUTO: 87.3 FL (ref 74–97)
MONOCYTES # BLD: 0.5 K/UL (ref 0.05–1.2)
MONOCYTES NFR BLD: 9 % (ref 3–10)
NEUTS SEG # BLD: 2.5 K/UL (ref 1.8–8)
NEUTS SEG NFR BLD: 43 % (ref 40–73)
PLATELET # BLD AUTO: 382 K/UL (ref 135–420)
PMV BLD AUTO: 9.9 FL (ref 9.2–11.8)
POTASSIUM SERPL-SCNC: 4.6 MMOL/L (ref 3.5–5.5)
RBC # BLD AUTO: 4.64 M/UL (ref 4.2–5.3)
SODIUM SERPL-SCNC: 144 MMOL/L (ref 136–145)
VIT B12 SERPL-MCNC: 308 PG/ML (ref 211–911)
WBC # BLD AUTO: 5.8 K/UL (ref 4.6–13.2)

## 2018-10-08 PROCEDURE — 82607 VITAMIN B-12: CPT | Performed by: SURGERY

## 2018-10-08 PROCEDURE — 84425 ASSAY OF VITAMIN B-1: CPT | Performed by: SURGERY

## 2018-10-08 PROCEDURE — 93005 ELECTROCARDIOGRAM TRACING: CPT

## 2018-10-08 PROCEDURE — 82040 ASSAY OF SERUM ALBUMIN: CPT | Performed by: SURGERY

## 2018-10-08 PROCEDURE — 83540 ASSAY OF IRON: CPT | Performed by: SURGERY

## 2018-10-08 PROCEDURE — 80048 BASIC METABOLIC PNL TOTAL CA: CPT | Performed by: SURGERY

## 2018-10-08 PROCEDURE — 85025 COMPLETE CBC W/AUTO DIFF WBC: CPT | Performed by: SURGERY

## 2018-10-08 PROCEDURE — 82306 VITAMIN D 25 HYDROXY: CPT | Performed by: SURGERY

## 2018-10-08 PROCEDURE — 36415 COLL VENOUS BLD VENIPUNCTURE: CPT | Performed by: SURGERY

## 2018-10-08 NOTE — LETTER
10/8/2018 9:31 AM 
 
Patient:  Jovany Jimenez YOB: 1960 Date of Visit: 10/8/2018 Maribeth Barnett MD 
[address] located within [facility] [address] VIA Facsimile: [phone] Dear Maribeth Barnett MD, Thank you for referring Ms. Nickolas Collazo to [facility] for evaluation and treatment. Below are the relevant portions of my assessment and plan of care. Initial Consultation for Bariatric Surgery Template (Gastric Sleeve) Jovany Jimenez is a 66 y.o. female who comes into the office today for initial consultation for the surgical options for the treatment of morbid obesity. The patient initially identified obesity at the age of 34 and at age 27 weighed 130lbs. She had a band placed in [address] [place] [address] in 2007 and removed by me last year. She has since regained weight and re-developed GUILLERMO. Today, the patient is  Height: 5' 4\" (162.6 cm) tall, Weight: 99.8 kg (220 lb) lbs for a Body mass index is 37.76 kg/(m^2). It is due to the patient's severe obesity, which is further complicated by GUILLERMO  that the patient is now seeking out bariatric surgery, specifically, sleeve gastrectomy. Past Medical History:  
Diagnosis Date  Constipation  Coxsackie carditis 1986  Sleep apnea Past Surgical History:  
Procedure Laterality Date  ADJUSTMENT GASTRIC BAND  HX CHOLECYSTECTOMY  2018  HX GI  11/20/2017  
 removal of Lap Band  HX HEENT  [id number] Plastic surgery to repair face after dog bite  HX LIPECTOMY Current Outpatient Prescriptions Medication Sig Dispense Refill  naltrexone HCl/bupropion HCl (CONTRAVE PO) Take  by mouth.  oxyCODONE-acetaminophen (PERCOCET) 5-325 mg per tablet Take 1-2 Tabs by mouth every four (4) hours as needed for Pain. Max Daily Amount: 12 Tabs. 40 Tab 0 No Known Allergies Social History Substance Use Topics  Smoking status: Never Smoker  Smokeless tobacco: Never Used  Alcohol use Yes Comment: occ Family History Problem Relation Age of Onset  Cancer Father Throat CA, Smoker Family Status Relation Status  Mother Alive  Father  Review of Systems:  Positive in BOLD 
 
CONST: Fever, weight loss, fatigue or chills GI: Nausea, vomiting, abdominal pain, change in bowel habits, hematochezia, melena, and GERD INTEG: Dermatitis, abnormal moles HEENT: Recent changes in vision, vertigo, epistaxis, dysphagia and hoarseness CV: Chest pain, palpitations, HTN, edema and varicosities RESP: Cough, shortness of breath, wheezing, hemoptysis, snoring and reactive airway disease : Hematuria, dysuria, frequency, urgency, nocturia and stress urinary incontinence MS: Weakness, joint pain and arthritis ENDO: Diabetes, thyroid disease, polyuria, polydipsia, polyphagia, poor wound healing, heat intolerance, cold intolerance LYMPH/HEME: Anemia, bruising and history of blood transfusions NEURO: Dizziness, headache, fainting, seizures and stroke PSYCH: Anxiety and depression Physical Exam 
 
Visit Vitals  /77 (BP 1 Location: Right arm, BP Patient Position: At rest)  Pulse 96  Temp 96.5 °F (35.8 °C)  Resp 20  
 Ht 5' 4\" (1.626 m)  Wt 99.8 kg (220 lb)  BMI 37.76 kg/m2 Pre op weight: 220 EBW: 86 Wt loss to date: 0 General: 66 y.o.) female in no acute distress. Morbidly obese in abdomen HEENT: Normocephalic, atraumatic, Pupils equal and reactive, nasopharynx clear, oropharynx clear and moist without lesions NECK: Supple, no lymphadenopathy, thyromegaly, carotid bruits or jugular venous distension. trachea midline RESP: Clear to auscultation bilaterally, no wheezes, rhonchi, or rales, normal respiratory excursion CV: Regular rate and rhythm, no murmurs, rubs or gallops. 3+/4 pulses in bilateral dorsalis pedis and posterior tibialis. No distal edema or varicosities. ABD: Soft, nontender, nondistended, normoactive bowel sounds, no hernias, no hepatosplenomegaly, easily palpable costal margins, gynecoid distribution, healed lap scars and removed port site Extremities: Warm, well perfused, no tenderness or swelling, normal gait/station Neuro: Sensation and strength grossly intact and symmetrical 
Psych: Alert and oriented to person, place, and time. Impression: 
 
Molly Alvarez is a 66 y.o. female who is suffering from morbid obesity with a BMI of 38  and comorbidities including GUILLERMO  who would benefit from bariatric surgery. We have had an extensive discussion with regard to the risks, benefits and likely outcomes of the operation. We've discussed the restrictive and malabsorptive nature of the gastric bypass and compared and contrasted with the sleeve gastrectomy. The patient understands the likelihood of losing approximately 80% of their excess weight in 12 to 18 months. She also understands the risks including but not limited to bleeding, infection, need for reoperation, ulcers, leaks and strictures, bowel obstruction secondary to adhesions and internal hernias, DVT, PE, heart attack, stroke, and death. Patient also understands risks of inadequate weight loss, excess weight loss, vitamin insufficiency, protein malnutrition, excess skin, and loss of hair. We have reviewed the components of a successful postoperative course including requirement for a high protein, low carbohydrate diet, 60 oz a day of zero calorie liquids, daily vitamin supplementation, daily exercise, regular follow-up, and participation in support groups. At this time we will enroll the patient in our bariatric program, undertake routine laboratory evaluation, chest X-ray, EKG, possible UGI and evaluation by  nutritionist as well as psychologist and pending their satisfactory completion of the preop evaluation, plan to perform a gastric bypass based on her scarring from her prior band. Thank you very much for your referral of Ms. Omar Max. If you have questions, please do not hesitate to call me. I look forward to following MsMoy Amanda Amezquita along with you and will keep you updated as to her progress.   
 
 
 
 
Sincerely, 
 
 
Karen Swann MD

## 2018-10-08 NOTE — PROGRESS NOTES
Initial Consultation for Bariatric Surgery Template (Gastric Sleeve)    Joselyn Spencer is a 66 y.o. female who comes into the office today for initial consultation for the surgical options for the treatment of morbid obesity. The patient initially identified obesity at the age of 34 and at age 27 weighed 130lbs. She had a band placed in [address] [place], [place] [address]way [address] in  and removed by me last year. She has since regained weight and re-developed GUILLERMO. Today, the patient is  Height: 5' 4\" (162.6 cm) tall, Weight: 99.8 kg (220 lb) lbs for a Body mass index is 37.76 kg/(m^2). It is due to the patient's severe obesity, which is further complicated by GUILLERMO  that the patient is now seeking out bariatric surgery, specifically, sleeve gastrectomy. Past Medical History:   Diagnosis Date    Constipation     Coxsackie carditis     Sleep apnea        Past Surgical History:   Procedure Laterality Date    ADJUSTMENT GASTRIC BAND      HX CHOLECYSTECTOMY      HX GI  2017    removal of Lap Band    HX HEENT      Plastic surgery to repair face after dog bite    HX LIPECTOMY         Current Outpatient Prescriptions   Medication Sig Dispense Refill    naltrexone HCl/bupropion HCl (CONTRAVE PO) Take  by mouth.  oxyCODONE-acetaminophen (PERCOCET) 5-325 mg per tablet Take 1-2 Tabs by mouth every four (4) hours as needed for Pain. Max Daily Amount: 12 Tabs.  40 Tab 0       No Known Allergies    Social History   Substance Use Topics    Smoking status: Never Smoker    Smokeless tobacco: Never Used    Alcohol use Yes      Comment: occ       Family History   Problem Relation Age of Onset    Cancer Father      Throat CA, Smoker       Family Status   Relation Status    Mother Alive    Father        Review of Systems:  Positive in BOLD    CONST: Fever, weight loss, fatigue or chills  GI: Nausea, vomiting, abdominal pain, change in bowel habits, hematochezia, melena, and GERD   INTEG: Dermatitis, abnormal moles  HEENT: Recent changes in vision, vertigo, epistaxis, dysphagia and hoarseness  CV: Chest pain, palpitations, HTN, edema and varicosities  RESP: Cough, shortness of breath, wheezing, hemoptysis, snoring and reactive airway disease  : Hematuria, dysuria, frequency, urgency, nocturia and stress urinary incontinence   MS: Weakness, joint pain and arthritis  ENDO: Diabetes, thyroid disease, polyuria, polydipsia, polyphagia, poor wound healing, heat intolerance, cold intolerance  LYMPH/HEME: Anemia, bruising and history of blood transfusions  NEURO: Dizziness, headache, fainting, seizures and stroke  PSYCH: Anxiety and depression      Physical Exam    Visit Vitals    /77 (BP 1 Location: Right arm, BP Patient Position: At rest)    Pulse 96    Temp 96.5 °F (35.8 °C)    Resp 20    Ht 5' 4\" (1.626 m)    Wt 99.8 kg (220 lb)    BMI 37.76 kg/m2       Pre op weight: 220  EBW: 86  Wt loss to date: 0       General: 66 y.o.) female in no acute distress. Morbidly obese in abdomen  HEENT: Normocephalic, atraumatic, Pupils equal and reactive, nasopharynx clear, oropharynx clear and moist without lesions  NECK: Supple, no lymphadenopathy, thyromegaly, carotid bruits or jugular venous distension. trachea midline  RESP: Clear to auscultation bilaterally, no wheezes, rhonchi, or rales, normal respiratory excursion  CV: Regular rate and rhythm, no murmurs, rubs or gallops. 3+/4 pulses in bilateral dorsalis pedis and posterior tibialis. No distal edema or varicosities. ABD: Soft, nontender, nondistended, normoactive bowel sounds, no hernias, no hepatosplenomegaly, easily palpable costal margins, gynecoid distribution, healed lap scars and removed port site  Extremities: Warm, well perfused, no tenderness or swelling, normal gait/station  Neuro: Sensation and strength grossly intact and symmetrical  Psych: Alert and oriented to person, place, and time.     Impression:    Sal Maldonado is a 66 y.o. female who is suffering from morbid obesity with a BMI of 38  and comorbidities including GUILLERMO  who would benefit from bariatric surgery. We have had an extensive discussion with regard to the risks, benefits and likely outcomes of the operation. We've discussed the restrictive and malabsorptive nature of the gastric bypass and compared and contrasted with the sleeve gastrectomy. The patient understands the likelihood of losing approximately 80% of their excess weight in 12 to 18 months. She also understands the risks including but not limited to bleeding, infection, need for reoperation, ulcers, leaks and strictures, bowel obstruction secondary to adhesions and internal hernias, DVT, PE, heart attack, stroke, and death. Patient also understands risks of inadequate weight loss, excess weight loss, vitamin insufficiency, protein malnutrition, excess skin, and loss of hair. We have reviewed the components of a successful postoperative course including requirement for a high protein, low carbohydrate diet, 60 oz a day of zero calorie liquids, daily vitamin supplementation, daily exercise, regular follow-up, and participation in support groups. At this time we will enroll the patient in our bariatric program, undertake routine laboratory evaluation, chest X-ray, EKG, possible UGI and evaluation by  nutritionist as well as psychologist and pending their satisfactory completion of the preop evaluation, plan to perform a gastric bypass based on her scarring from her prior band.

## 2018-10-08 NOTE — COMMUNICATION BODY
Initial Consultation for Bariatric Surgery Template (Gastric Sleeve)    Juancarlos Hays is a 66 y.o. female who comes into the office today for initial consultation for the surgical options for the treatment of morbid obesity. The patient initially identified obesity at the age of 34 and at age 27 weighed 130lbs. She had a band placed in [address] [place], [place] [address] in  and removed by me last year. She has since regained weight and re-developed GUILLERMO. Today, the patient is  Height: 5' 4\" (162.6 cm) tall, Weight: 99.8 kg (220 lb) lbs for a Body mass index is 37.76 kg/(m^2). It is due to the patient's severe obesity, which is further complicated by GUILLERMO  that the patient is now seeking out bariatric surgery, specifically, sleeve gastrectomy. Past Medical History:   Diagnosis Date    Constipation     Coxsackie carditis     Sleep apnea        Past Surgical History:   Procedure Laterality Date    ADJUSTMENT GASTRIC BAND      HX CHOLECYSTECTOMY      HX GI  2017    removal of Lap Band    HX HEENT      Plastic surgery to repair face after dog bite    HX LIPECTOMY         Current Outpatient Prescriptions   Medication Sig Dispense Refill    naltrexone HCl/bupropion HCl (CONTRAVE PO) Take  by mouth.  oxyCODONE-acetaminophen (PERCOCET) 5-325 mg per tablet Take 1-2 Tabs by mouth every four (4) hours as needed for Pain. Max Daily Amount: 12 Tabs.  40 Tab 0       No Known Allergies    Social History   Substance Use Topics    Smoking status: Never Smoker    Smokeless tobacco: Never Used    Alcohol use Yes      Comment: occ       Family History   Problem Relation Age of Onset    Cancer Father      Throat CA, Smoker       Family Status   Relation Status    Mother Alive    Father        Review of Systems:  Positive in BOLD    CONST: Fever, weight loss, fatigue or chills  GI: Nausea, vomiting, abdominal pain, change in bowel habits, hematochezia, melena, and GERD   INTEG: Dermatitis, abnormal moles  HEENT: Recent changes in vision, vertigo, epistaxis, dysphagia and hoarseness  CV: Chest pain, palpitations, HTN, edema and varicosities  RESP: Cough, shortness of breath, wheezing, hemoptysis, snoring and reactive airway disease  : Hematuria, dysuria, frequency, urgency, nocturia and stress urinary incontinence   MS: Weakness, joint pain and arthritis  ENDO: Diabetes, thyroid disease, polyuria, polydipsia, polyphagia, poor wound healing, heat intolerance, cold intolerance  LYMPH/HEME: Anemia, bruising and history of blood transfusions  NEURO: Dizziness, headache, fainting, seizures and stroke  PSYCH: Anxiety and depression      Physical Exam    Visit Vitals    /77 (BP 1 Location: Right arm, BP Patient Position: At rest)    Pulse 96    Temp 96.5 °F (35.8 °C)    Resp 20    Ht 5' 4\" (1.626 m)    Wt 99.8 kg (220 lb)    BMI 37.76 kg/m2       Pre op weight: 220  EBW: 86  Wt loss to date: 0       General: 66 y.o.) female in no acute distress. Morbidly obese in abdomen  HEENT: Normocephalic, atraumatic, Pupils equal and reactive, nasopharynx clear, oropharynx clear and moist without lesions  NECK: Supple, no lymphadenopathy, thyromegaly, carotid bruits or jugular venous distension. trachea midline  RESP: Clear to auscultation bilaterally, no wheezes, rhonchi, or rales, normal respiratory excursion  CV: Regular rate and rhythm, no murmurs, rubs or gallops. 3+/4 pulses in bilateral dorsalis pedis and posterior tibialis. No distal edema or varicosities. ABD: Soft, nontender, nondistended, normoactive bowel sounds, no hernias, no hepatosplenomegaly, easily palpable costal margins, gynecoid distribution, healed lap scars and removed port site  Extremities: Warm, well perfused, no tenderness or swelling, normal gait/station  Neuro: Sensation and strength grossly intact and symmetrical  Psych: Alert and oriented to person, place, and time.     Impression:    Lj Ross is a 66 y.o. female who is suffering from morbid obesity with a BMI of 38  and comorbidities including GUILLERMO  who would benefit from bariatric surgery. We have had an extensive discussion with regard to the risks, benefits and likely outcomes of the operation. We've discussed the restrictive and malabsorptive nature of the gastric bypass and compared and contrasted with the sleeve gastrectomy. The patient understands the likelihood of losing approximately 80% of their excess weight in 12 to 18 months. She also understands the risks including but not limited to bleeding, infection, need for reoperation, ulcers, leaks and strictures, bowel obstruction secondary to adhesions and internal hernias, DVT, PE, heart attack, stroke, and death. Patient also understands risks of inadequate weight loss, excess weight loss, vitamin insufficiency, protein malnutrition, excess skin, and loss of hair. We have reviewed the components of a successful postoperative course including requirement for a high protein, low carbohydrate diet, 60 oz a day of zero calorie liquids, daily vitamin supplementation, daily exercise, regular follow-up, and participation in support groups. At this time we will enroll the patient in our bariatric program, undertake routine laboratory evaluation, chest X-ray, EKG, possible UGI and evaluation by  nutritionist as well as psychologist and pending their satisfactory completion of the preop evaluation, plan to perform a gastric bypass based on her scarring from her prior band.

## 2018-10-08 NOTE — PROGRESS NOTES
Rody Velazquez is a 66 y.o. female who presents today with   Chief Complaint   Patient presents with    Morbid Obesity     Consult                Body mass index is 37.76 kg/(m^2). 1. Have you been to the ER, urgent care clinic since your last visit? Hospitalized since your last visit? No    2. Have you seen or consulted any other health care providers outside of the [address] [place] [address] since your last visit? Include any pap smears or colon screening.  No

## 2018-10-09 LAB
ATRIAL RATE: 73 BPM
CALCULATED P AXIS, ECG09: 33 DEGREES
CALCULATED R AXIS, ECG10: 3 DEGREES
CALCULATED T AXIS, ECG11: 38 DEGREES
DIAGNOSIS, 93000: NORMAL
P-R INTERVAL, ECG05: 186 MS
Q-T INTERVAL, ECG07: 404 MS
QRS DURATION, ECG06: 96 MS
QTC CALCULATION (BEZET), ECG08: 445 MS
VENTRICULAR RATE, ECG03: 73 BPM

## 2018-10-10 LAB
25(OH)D3 SERPL-MCNC: 19.8 NG/ML (ref 30–100)
VIT B1 BLD-SCNC: 105.2 NMOL/L (ref 66.5–200)

## 2018-10-11 LAB — UREA BREATH TEST QL: NEGATIVE

## 2018-10-16 ENCOUNTER — DOCUMENTATION ONLY (OUTPATIENT)
Dept: SURGERY | Age: 58
End: 2018-10-16

## 2018-10-16 NOTE — PROGRESS NOTES
[address] [place] [facility] [address] [facility], [address]    Patient's Name: Sathya Roy   Age: 66 y.o. YOB: 1960   Sex: female    Date:   10/10/2018    Insurance:  [employer]          Session: 1 of 4  Surgeon:  Bryon Emanuel    Height: 5'4\" Weight:    220      Lbs. BMI: 37     Starting Weight: 220       Do you smoke? no    Alcohol intake:  I do drink occasionally. Class Guidelines    Guidelines are reviewed with patient at the start of every class. 1. Patient understands that weight loss trial classes must be consecutive. Patient understands if they miss a class, it is their responsibility to contact me to reschedule class. I will reach out to patient after their first no show. 2.  Patient understands the expectations that weight maintenance/weight loss is expected during the classes. Failure to demonstrate changes may result in one extra month of weight loss trial, followed by going back to see the surgeon. 3. Patient is also instructed to be doing their labs, blood work, psych visit, support group and any other test that the surgeon has used while they are working on their weight loss trial.  4. Patient is instructed to bring their education binder to all classes. 5.  Patient has been taught how to follow a 1600 calorie restricted diet. Eating Habits and Behaviors      Today we reviewed key diet principles. We talked about patient following a low calorie/low carbohydrate diet while they are in weight loss trials. To achieve this, patient is encouraged to avoid liquid calories, including alcohol, soda, sweet tea, and fruit juices. Patient can cut carbohydrates by trying to stick to meat and vegetables. Patient can also eat eggs, cheese, and good fat, while trying to eliminate starches, such as pasta, rice, crackers, chips, popcorn.     I also gave a power point that included 21 Ways to Stay on Track with a Healthy Lifestyle. Some of the food-related suggestions included drinking plenty of water or calorie-free beverages prior to their meal.  Patient is encouraged to to fill up on protein first, which is the ultimate fill-me up food. We talked about the importance of eating breakfast and the effects that can happen if one skips meals, which includes eating larger portions, snacking more, and decreasing their metabolism. With the suggestions in the power point, patient will be able to decrease their calories and carbohydrate intake. Patient's current diet habits include:   Breakfast:  Biscuit sweet tea  Lunch:  [place]  Dinner:  [place] salad/pizza        Physical Activity/Exercise    Comments: We talked about the importance of establishing a work out routine. Patient is currently doing doing chair exercises. for activity. Behavior Modification       Comments:   Some of the behavior tips that were included in the power point, include being choosy about night time snacking. Patient was encouraged to make the TV a no eating zone and not eat after 7 pm.  Patient is also encouraged to keep a food journal.    Patient is eating on a small plate, is chewing her food well, and is not eating out of stress. I also reminded all patients to make their psychologist appointment and attend at least 1 support group. Patient knows she can't gain weight though out the WLT.     Alf Mcknight, RD  10/10/2018

## 2018-11-01 ENCOUNTER — DOCUMENTATION ONLY (OUTPATIENT)
Dept: SURGERY | Age: 58
End: 2018-11-01

## 2018-11-19 ENCOUNTER — DOCUMENTATION ONLY (OUTPATIENT)
Dept: SURGERY | Age: 58
End: 2018-11-19

## 2018-11-19 NOTE — PROGRESS NOTES
[facility]      Patient's Name: Juliana Chatman   Age: 66 y.o. YOB: 1960   Sex: female    Date:   11/14/2018    Insurance:  [employer] [facility]          Session: 2 of  3  Surgeon:  Hitesh Hurt    Height: 5'4\" Weight:    223      Lbs. BMI: 37       Starting Weight: 220#        Do you smoke? no    Alcohol intake:  Number of drinks at a time:  2  Number of times a week: 1    Class Guidelines    Guidelines are reviewed with patient at the start of every class. 1. Patient understands that weight loss trial classes must be consecutive. Patient understands if they miss a class, it is their responsibility to contact me to reschedule class. I will reach out to patient after their first no show. 2.  Patient understands the expectations that weight maintenance/weight loss is expected during the classes. Failure to demonstrate changes may result in one extra month of weight loss trial, followed by going back to see the surgeon. 3. Patient is also instructed to be doing their labs, blood work, psych visit, support group and any other test that the surgeon has used while they are working on their weight loss trial.  4.  Patient is compliant with a 1800 calorie restricted diet. Changes Made Since Last Class: Working our at [employer]. Smarter choices. Dietary Instruction    During today's class we continued to focus on the key diet principles. Patient was instructed to follow a low carbohydrate diet, focusing on meat and vegetables. Patient was instructed to stop liquid calories and aim for 64 ounces of water per day. In class, I also gave patient a power point on surviving the holidays. Some of the tips included survival tips for parties, including bringing their own low carbohydrate dish to a potluck dinner and surveying the buffet line before they start filling up their plate.   Patient was given cooking alternatives, including using Splenda for sugar, substituting applesauce for oil in recipes, and using low fat plain yogurt instead of sour cream in dips. Patient was also encouraged to be mindful of calories in alcohol. Patient's diet habits include:   Breakfast:  Cereal.  I small bowl. Lunch:  Soup or salad. Dinner:  Fareed Sours. Physical Activity/Exercise    Patient is currently doing more walking and weight training for activity. Today's power point on surviving the holidays also included tips on exercising. This included being creative during the holiday, walking stairs, mall walking, getting resistance bands. Patient was encouraged not to be afraid to excuse themselves from the table to go for a walk after they eat. Behavior Modification    Reinforced behavior changes to make. Patient was encouraged to keep their emotions in check. Try to HALT and focus on whether they are eating out of hunger or if they are eating out of emotions. Other eating behaviors included surveying the buffet line before starting to fill up their plate. Patient was given a check off list and encouraged to monitor some of their eating behaviors, such as eating slowly, chewing their food thoroughly, and taking 20-30 minutes to eat a meal.    Goals that patient set for next month include:  I want to cut out soda. I am going to change to decaf tea.           Raul Hernandez, CRISTIN  11/14/2018

## 2018-12-26 ENCOUNTER — OFFICE VISIT (OUTPATIENT)
Dept: SURGERY | Age: 58
End: 2018-12-26

## 2018-12-26 VITALS
SYSTOLIC BLOOD PRESSURE: 161 MMHG | RESPIRATION RATE: 20 BRPM | DIASTOLIC BLOOD PRESSURE: 83 MMHG | TEMPERATURE: 98.6 F | BODY MASS INDEX: 38.76 KG/M2 | HEIGHT: 64 IN | WEIGHT: 227 LBS | HEART RATE: 88 BPM

## 2018-12-26 DIAGNOSIS — E66.9 OBESITY (BMI 30-39.9): Primary | ICD-10-CM

## 2018-12-26 DIAGNOSIS — G47.33 OSA (OBSTRUCTIVE SLEEP APNEA): ICD-10-CM

## 2018-12-26 RX ORDER — HYDROCHLOROTHIAZIDE 12.5 MG/1
12.5 CAPSULE ORAL
COMMUNITY
Start: 2018-11-19 | End: 2019-01-25

## 2018-12-26 RX ORDER — TRETINOIN 0.25 MG/G
CREAM TOPICAL
COMMUNITY
Start: 2018-11-19

## 2018-12-26 NOTE — PROGRESS NOTES
Isidra Sierra is a 66 y.o. female who presents today with   Chief Complaint   Patient presents with    Morbid Obesity     Mid trial          Body mass index is 38.96 kg/m². 1. Have you been to the ER, urgent care clinic since your last visit? Hospitalized since your last visit? No    2. Have you seen or consulted any other health care providers outside of the [address] [place] [address] since your last visit? Include any pap smears or colon screening.  No

## 2018-12-26 NOTE — PROGRESS NOTES
Bariatric Preoperative Progress note:    Subjective:     Franklyn Emery is a 66 y.o. female who presents today for followup of their candidacy for bariatric surgery. is due to the patient's severe obesity, which is further complicated by GUILLERMO  that the patient is now seeking out bariatric surgery. Since last seen, Franklyn Emery has been working through bariatric program towards LGBP. Past Medical History:   Diagnosis Date    Constipation     Coxsackie carditis 1986    Sleep apnea        Past Surgical History:   Procedure Laterality Date    ADJUSTMENT GASTRIC BAND      HX CHOLECYSTECTOMY  2018    HX GI  11/20/2017    removal of Lap Band    HX HEENT  20007    Plastic surgery to repair face after dog bite    HX LIPECTOMY         Current Outpatient Medications   Medication Sig Dispense Refill    hydroCHLOROthiazide (MICROZIDE) 12.5 mg capsule 12.5 mg.      tretinoin (RETIN-A) 0.025 % topical cream Use 1 Application to affected area Every Night at Bedtime. APPLY 20-30 MINUTES AFTER WASHING/DRYING SKIN. FOR ACNE      naltrexone HCl/bupropion HCl (CONTRAVE PO) Take  by mouth.  oxyCODONE-acetaminophen (PERCOCET) 5-325 mg per tablet Take 1-2 Tabs by mouth every four (4) hours as needed for Pain. Max Daily Amount: 12 Tabs.  40 Tab 0       No Known Allergies    Review of Systems:  Positive in BOLD     CONST: Fever, weight loss, fatigue or chills  GI: Nausea, vomiting, abdominal pain, change in bowel habits, hematochezia, melena, and GERD   INTEG: Dermatitis, abnormal moles  HEENT: Recent changes in vision, vertigo, epistaxis, dysphagia and hoarseness  CV: Chest pain, palpitations, HTN, edema and varicosities  RESP: Cough, shortness of breath, wheezing, hemoptysis, snoring and reactive airway disease  : Hematuria, dysuria, frequency, urgency, nocturia and stress urinary incontinence   MS: Weakness, joint pain and arthritis  ENDO: Diabetes, thyroid disease, polyuria, polydipsia, polyphagia, poor wound healing, heat intolerance, cold intolerance  LYMPH/HEME: Anemia, bruising and history of blood transfusions  NEURO: Dizziness, headache, fainting, seizures and stroke  PSYCH: Anxiety and depression        Objective:     Physical Exam:  Visit Vitals  /83 (BP 1 Location: Right arm, BP Patient Position: At rest)   Pulse 88   Temp 98.6 °F (37 °C) (Oral)   Resp 20   Ht 5' 4\" (1.626 m)   Wt 103 kg (227 lb)   BMI 38.96 kg/m²       Physical Exam   Constitutional: She is oriented to person, place, and time and well-developed, well-nourished, and in no distress. HENT:   Head: Normocephalic. Cardiovascular: Normal rate and normal heart sounds. Pulmonary/Chest: Effort normal and breath sounds normal.   Abdominal: Soft. Musculoskeletal: Normal range of motion. Neurological: She is alert and oriented to person, place, and time. Skin: Skin is warm and dry. Psychiatric: Affect normal.   Nursing note and vitals reviewed. Studies to date:    Labs: Results for Chela Morel (MRN [id number]) as of 12/26/2018 13:51   Ref. Range 10/8/2018 10:19   WBC Latest Ref Range: 4.6 - 13.2 K/uL 5.8   RBC Latest Ref Range: 4.20 - 5.30 M/uL 4.64   HGB Latest Ref Range: 12.0 - 16.0 g/dL 12.8   HCT Latest Ref Range: 35.0 - 45.0 % 40.5   MCV Latest Ref Range: 74.0 - 97.0 FL 87.3   MCH Latest Ref Range: 24.0 - 34.0 PG 27.6   MCHC Latest Ref Range: 31.0 - 37.0 g/dL 31.6   RDW Latest Ref Range: 11.6 - 14.5 % 14.7 (H)   PLATELET Latest Ref Range: 135 - 420 K/uL 382   MPV Latest Ref Range: 9.2 - 11.8 FL 9.9   NEUTROPHILS Latest Ref Range: 40 - 73 % 43   LYMPHOCYTES Latest Ref Range: 21 - 52 % 44   MONOCYTES Latest Ref Range: 3 - 10 % 9   EOSINOPHILS Latest Ref Range: 0 - 5 % 3   BASOPHILS Latest Ref Range: 0 - 2 % 1   DF Latest Units:   AUTOMATED   ABS. NEUTROPHILS Latest Ref Range: 1.8 - 8.0 K/UL 2.5   ABS. LYMPHOCYTES Latest Ref Range: 0.9 - 3.6 K/UL 2.6   ABS. MONOCYTES Latest Ref Range: 0.05 - 1.2 K/UL 0.5   ABS. EOSINOPHILS Latest Ref Range: 0.0 - 0.4 K/UL 0.2   ABS. BASOPHILS Latest Ref Range: 0.0 - 0.1 K/UL 0.1   Sodium Latest Ref Range: 136 - 145 mmol/L 144   Potassium Latest Ref Range: 3.5 - 5.5 mmol/L 4.6   Chloride Latest Ref Range: 100 - 108 mmol/L 108   CO2 Latest Ref Range: 21 - 32 mmol/L 29   Anion gap Latest Ref Range: 3.0 - 18 mmol/L 7   Glucose Latest Ref Range: 74 - 99 mg/dL 91   BUN Latest Ref Range: 7.0 - 18 MG/DL 10   Creatinine Latest Ref Range: 0.6 - 1.3 MG/DL 0.83   BUN/Creatinine ratio Latest Ref Range: 12 - 20   12   Calcium Latest Ref Range: 8.5 - 10.1 MG/DL 8.8   GFR est non-AA Latest Ref Range: >60 ml/min/1.73m2 >60   GFR est AA Latest Ref Range: >60 ml/min/1.73m2 >60   Albumin Latest Ref Range: 3.4 - 5.0 g/dL 3.5   Vitamin B12 Latest Ref Range: 211 - 911 pg/mL 308   Folate Latest Ref Range: 3.10 - 17.50 ng/mL 17.1   Iron Latest Ref Range: 50 - 175 ug/dL 51   VITAMIN B1, WHOLE BLOOD Unknown WNL        EKG: Normal sinus rhythm   Normal ECG   No previous ECGs available   Confirmed by Chilo Patel ([id number]) on 10/9/2018 8:10:34 AM    Nutritional evaluation: 3/4 WLT     Psychiatric evaluation: approved     PCP clearance: pending BP control     Assessment:   Juancarlos Hays is a 66 y.o. female who is progressing through the bariatric preoperative evaluation. At this time, they are an appropriate candidate for weight loss surgery pending clearance form PCP re BP and weight loss. Plan:   -complete remainder of preop evaluation including WLT, support group. - Vit D3 5000 unit daily recommended   -pt communicates understanding that the expectation is to lose or maintain weight during WLT. Weight gain may result in delaying surgery.   -Follow up once has completed entirety of weight loss workup to determine next steps.        >50% of 30 min visit spent counseling   Madelyn Pascual, CELIAP-BC

## 2018-12-31 ENCOUNTER — DOCUMENTATION ONLY (OUTPATIENT)
Dept: SURGERY | Age: 58
End: 2018-12-31

## 2018-12-31 NOTE — PROGRESS NOTES
[employer] [facility] [place]  [address] [address], [address] [place] [address]    Name: Stevie Mills  : 1960      Date: 2018   Insurance:  Marine Maciel            Session:  3 of  4   Surgeon:   Dr. Jennifer Sagastume    Height: 5'4\"   Weight:    222      Lbs. BMI: 38         Starting Weight: 220     Do you smoke? no    Alcohol intake:    Patient does not drink. Class Guidelines    Guidelines are reviewed with patient at the start of every class. 1. Patient understands that weight loss trial classes must be consecutive. Patient understands if they miss a class, it is their responsibility to contact me to reschedule class. I will reach out to patient after their first no show. 2.  Patient understands the expectations that weight maintenance/weight loss is expected during the classes. Failure to demonstrate changes may result in one extra month of weight loss trial, followed by going back to see the surgeon. Patient understands that they CAN NOT gain any weight during the weight loss trial.  Gaining weight will result in extra classes. 3. Patient is also instructed to be doing their labs, blood work, psych visit, support group and any other test that the surgeon has used while they are working on their weight loss trial.  4.  Patient was instructed to bring their blue binder to every class and appointment. Changes Made Since Last Class:   More exercises. No soda. Eating Habits and Behaviors    Today in class, we reviewed the key diet principles. Specifically, patient was instructed to watch their carbohydrate intake. We talked about foods that have carbohydrates in them and alternatives in place of this. Patient was encouraged to start cutting out bread, rice, pasta, and other starches. Patient is encouraged to work towards 64 ounces of fluid per day, avoiding soda, sweet tea, and fruit juices.       I also gave a presentation at today's class on Eat Out Options. We looked at fast food traps and dining out strategies to stay in control. Patient was also given ideas from various restaurants that would be a healthier option. Patient's current diet habits include:   Breakfast:  Eggs, sausage. Lunch:  Salad. Dinner:  Adam Peal. Physical Activity/Exercise    Comments: We talked about exercise. Patient was given reasons of why exercise is so important and how that can help with their long-term success. I have encouraged patient to get a support system to help with the activity. Currently for activity, patient is doing more walking, weightlifting. .      Behavior Modification       Comments:  I have also talked to patient about some behavior changes including taking 20-30 minutes to eat a meal.  Patient is encouraged to get a timer and use smaller utensils to help them stretch their meal out. Patient is also encouraged to get into a routine of not eating after 7 pm and make the TV a no eating zone. Patient is using small plates. Goals that patient wants to work on includes:  1. Cut sodas for life.     Tatum Gee, CRISTIN  12/19/2018

## 2019-01-03 ENCOUNTER — DOCUMENTATION ONLY (OUTPATIENT)
Dept: SURGERY | Age: 59
End: 2019-01-03

## 2019-01-03 NOTE — PROGRESS NOTES
[facility]    Patient's Name: Peggy Velasquez   Age: 66 y.o. YOB: 1960   Sex: female    Date:   1/3/2019    Insurance:  Costa moralez           Session: 4 of  4      Surgeon:  Jovita Medina    Height: 5'4\"   Weight:    219      Lbs. BMI: 37       Starting Weight: 220         Do you smoke? no    Alcohol intake:  Number of drinks at a time:  1  Number of times a week: 1    Class Guidelines    Guidelines are reviewed with patient at the start of every class. 1. Patient understands that weight loss trial classes must be consecutive. Patient understands if they miss a class, it is their responsibility to contact me to reschedule class. I will reach out to patient after their first no show. 2.  Patient understands the expectations that weight maintenance/weight loss is expected during the classes. Failure to demonstrate changes may result in one extra month of weight loss trial, followed by going back to see the surgeon. 3. Patient is also instructed to be doing their labs, blood work, psych visit, support group and any other test that the surgeon has used while they are working on their weight loss trial.  4.  Patient is compliant with an 1800 calorie restricted diet. Changes Made Since Last Class:   No sodas. No sweet tea. More activity. Eating Habits and Behaviors      Today we reviewed key diet principles. We talked about snack ideas that would focus more on protein. We also talked about the benefits of filling up on protein first and keeping the daily carbohydrate intake to less than 100 grams per day. Patient was instructed to increase fluid intake to 64 ounces per day and stop all carbonation, caffeine, and sugary drinks. During class, we talked about the importance of getting on a routine of eating 3 meals a day, eating within one hour of waking up, and not going longer than 4 hours without fueling the body again.     I also talked with patient about some meal ideas. Patient's current diet habits include:   Breakfast:  2 eggs. Lunch:  Salad,light dressing  Dinner:  Sr Bill or tuna. Physical Activity/Exercise    Comments:     Currently for exercise, patient is walking more. We talked about activities for patient to do, including walking, swimming, or chair exercises. Behavior Modification       Comments:   During class, I reviewed a power point with patients called, \"Assessing Your Readiness to Change. \"  During this power point, patient was asked to self-evaluate themselves. At the end, we tallied the scores to determine how ready they are to make changes for the surgery. For the New Year's, I had patient set New Year's resolutions, including a food-related goal, exercise-related goal, and behavior goal.  Patient was encouraged to track the goals on a daily basis using the check off list I provided. Goals should be SMART, specific, measurable, attainable, realistic, and time-orientated. Patient's Goals are:  1. Behavior-Related Goal: smarter choices. 2. Food-related goal: running  3. Exercise-related goal: no stress eating. Conclusion:    Patient has completed a 4 month WLT . She has been an active participant in the class. She is cleared from a nutritional perspective.       [facility], RD  1/3/2019

## 2019-01-04 ENCOUNTER — TELEPHONE (OUTPATIENT)
Dept: SURGERY | Age: 59
End: 2019-01-04

## 2019-01-14 ENCOUNTER — OFFICE VISIT (OUTPATIENT)
Dept: SURGERY | Age: 59
End: 2019-01-14

## 2019-01-14 VITALS
SYSTOLIC BLOOD PRESSURE: 133 MMHG | HEIGHT: 62 IN | BODY MASS INDEX: 40.48 KG/M2 | TEMPERATURE: 95.3 F | WEIGHT: 220 LBS | HEART RATE: 98 BPM | DIASTOLIC BLOOD PRESSURE: 88 MMHG

## 2019-01-14 DIAGNOSIS — E66.01 MORBID OBESITY (HCC): Primary | ICD-10-CM

## 2019-01-14 DIAGNOSIS — Z98.84 H/O LAPAROSCOPIC ADJUSTABLE GASTRIC BANDING: ICD-10-CM

## 2019-01-14 DIAGNOSIS — G47.33 OSA (OBSTRUCTIVE SLEEP APNEA): ICD-10-CM

## 2019-01-14 DIAGNOSIS — I10 ESSENTIAL HYPERTENSION: ICD-10-CM

## 2019-01-14 NOTE — H&P (VIEW-ONLY)
Preop History and Physical Exam: 
 
Erlinda Goff is a 66 y.o. female who comes into the office today after completing the entirety of the bariatric preoperative protocol satisfactorily. She had an eroded lap band removed by me in 2017 via gastrotomy. She enrolled in the program after gaining weight last summer and has completed the program now. Today, the patient is Height: 5' 2\" (157.5 cm) tall, Weight: 99.8 kg (220 lb) lbs for a Body mass index is 40.24 kg/m². It is due to their severe obesity, which is further complicated by hypertension and obstructive sleep apnea - CPAP  that the patient is now seeking out bariatric surgery, specifically, the gastric bypass Past Medical History:  
Diagnosis Date  Constipation  Coxsackie carditis 1986  Sleep apnea Past Surgical History:  
Procedure Laterality Date  ADJUSTMENT GASTRIC BAND  HX CHOLECYSTECTOMY  2018  HX GI  11/20/2017  
 removal of Lap Band  HX HEENT  [id number] Plastic surgery to repair face after dog bite  HX LIPECTOMY Current Outpatient Medications Medication Sig Dispense Refill  cpap machine kit by Does Not Apply route.  hydroCHLOROthiazide (MICROZIDE) 12.5 mg capsule 12.5 mg.    
 naltrexone HCl/bupropion HCl (CONTRAVE PO) Take  by mouth.  tretinoin (RETIN-A) 0.025 % topical cream Use 1 Application to affected area Every Night at Bedtime. APPLY 20-30 MINUTES AFTER WASHING/DRYING SKIN. FOR ACNE  oxyCODONE-acetaminophen (PERCOCET) 5-325 mg per tablet Take 1-2 Tabs by mouth every four (4) hours as needed for Pain. Max Daily Amount: 12 Tabs. 40 Tab 0 No Known Allergies Social History Tobacco Use  Smoking status: Never Smoker  Smokeless tobacco: Never Used Substance Use Topics  Alcohol use: Yes Comment: occ  Drug use: No  
 
 
Family History Problem Relation Age of Onset  Cancer Father Throat CA, Smoker Review of Systems:  Positive in BOLD 
 CONST: Fever, weight loss, fatigue or chills GI: Nausea, vomiting, abdominal pain, change in bowel habits, hematochezia, melena, and GERD INTEG: Dermatitis, abnormal moles HEENT: Recent changes in vision, vertigo, epistaxis, dysphagia and hoarseness CV: Chest pain, palpitations, HTN, edema and varicosities RESP: Cough, shortness of breath, wheezing, hemoptysis, snoring and reactive airway disease : Hematuria, dysuria, frequency, urgency, nocturia and stress urinary incontinence MS: Weakness, joint pain and arthritis ENDO: Diabetes, thyroid disease, polyuria, polydipsia, polyphagia, poor wound healing, heat intolerance, cold intolerance LYMPH/HEME: Anemia, bruising and history of blood transfusions NEURO: Dizziness, headache, fainting, seizures and stroke PSYCH: Anxiety and depression Physical Exam 
 
Visit Vitals /88 (BP 1 Location: Right arm, BP Patient Position: Sitting) Pulse 98 Temp 95.3 °F (35.2 °C) (Oral) Ht 5' 2\" (1.575 m) Wt 99.8 kg (220 lb) BMI 40.24 kg/m² General: 66 y.o.) female in no acute distress. Morbidly obese in abdomen HEENT: Normocephalic, atraumatic, Pupils equal and reactive, nasopharynx clear, oropharynx clear and moist without lesions NECK: Supple, no lymphadenopathy, thyromegaly, carotid bruits or jugular venous distension. trachea midline RESP: Clear to auscultation bilaterally, no wheezes, rhonchi, or rales, normal respiratory excursion CV: Regular rate and rhythm, no murmurs, rubs or gallops. 3+/4 pulses in bilateral dorsalis pedis and posterior tibialis. No distal edema or varicosities. ABD: Soft, nontender, nondistended, normoactive bowel sounds, no hernias, no hepatosplenomegaly, easily palpable costal margins, gynecoid distribution, healed lap scars and removed port site Extremities: Warm, well perfused, no tenderness or swelling, normal gait/station Neuro: Sensation and strength grossly intact and symmetrical 
 Psych: Alert and oriented to person, place, and time. Studies: LABS: within normal limits except for hypovit D 
EKG: without significant abnormalities NUTRITIONAL EVALUATION has deemed the patient a good candidate for weight loss surgery. She had lost one lbs on the WLT but is currently at the same weight as the start of her WLT. PSYCHIATRIC EVALUATION has deemed the patient a good candidate for weight loss surgery Impression: 
 
Virgilio Carey is a 66 y.o. female who is suffering from morbid obesity and comorbidities including hypertension and obstructive sleep apnea - CPAPwho would benefit from bariatric surgery. We've discussed the restrictive and malabsorptive nature of the gastric bypass and compared and contrasted with the sleeve gastrectomy. The patient understands the likelihood of losing approximately 80% of their excess weight in 12 to 18 months. She also understands the risks including but not limited to bleeding, infection, need for reoperation, anastomotic ulcers, leaks and strictures, bowel obstruction secondary to adhesions and internal hernias, DVT, PE, heart attack, stroke, and death. Patient also understands risks of inadequate weight loss, excess weight loss, vitamin insufficiency, protein malnutrition, excess skin, and loss of hair. We have reviewed the components of a successful postoperative course including requirement for a high protein, low carbohydrate diet, 60 oz a day of zero calorie liquids, daily vitamin supplementation, daily exercise, regular follow-up, and participation in support groups. We have reviewed the preoperative liver shrinking clear liquid diet, as well as reviewed any medication changes required while on the clear liquid diet.   In addition, the patient understands that all medications to be taken during the first 8 weeks postoperatively can be taken whole as long as the medication is approximately the size of a Bubba 325 mg aspirin tablet in size. The patient further understands that it is his/her responsibility to review these and verify with their primary care doctor and pharmacist that all medications are of the appropriate size. We will schedule the patient for laparoscopic gastric bypass  in the near future. She is aware that she is at higher risk for complications to specifically include bleeding, injury to surrounding structures and leakage due to the fact that this is a conversion from a prior eroded band. She further understands this will require longer operative time and a higher risk for open conversion and/or drain placement.

## 2019-01-14 NOTE — COMMUNICATION BODY
Preop History and Physical Exam:    Erlinda Goff is a 66 y.o. female who comes into the office today after completing the entirety of the bariatric preoperative protocol satisfactorily. She had an eroded lap band removed by me in 2017 via gastrotomy. She enrolled in the program after gaining weight last summer and has completed the program now. Today, the patient is Height: 5' 2\" (157.5 cm) tall, Weight: 99.8 kg (220 lb) lbs for a Body mass index is 40.24 kg/m². It is due to their severe obesity, which is further complicated by hypertension and obstructive sleep apnea - CPAP  that the patient is now seeking out bariatric surgery, specifically, the gastric bypass      Past Medical History:   Diagnosis Date    Constipation     Coxsackie carditis 1986    Sleep apnea        Past Surgical History:   Procedure Laterality Date    ADJUSTMENT GASTRIC BAND      HX CHOLECYSTECTOMY  2018    HX GI  11/20/2017    removal of Lap Band    HX HEENT  20007    Plastic surgery to repair face after dog bite    HX LIPECTOMY         Current Outpatient Medications   Medication Sig Dispense Refill    cpap machine kit by Does Not Apply route.  hydroCHLOROthiazide (MICROZIDE) 12.5 mg capsule 12.5 mg.      naltrexone HCl/bupropion HCl (CONTRAVE PO) Take  by mouth.  tretinoin (RETIN-A) 0.025 % topical cream Use 1 Application to affected area Every Night at Bedtime. APPLY 20-30 MINUTES AFTER WASHING/DRYING SKIN. FOR ACNE      oxyCODONE-acetaminophen (PERCOCET) 5-325 mg per tablet Take 1-2 Tabs by mouth every four (4) hours as needed for Pain. Max Daily Amount: 12 Tabs.  40 Tab 0       No Known Allergies    Social History     Tobacco Use    Smoking status: Never Smoker    Smokeless tobacco: Never Used   Substance Use Topics    Alcohol use: Yes     Comment: occ    Drug use: No       Family History   Problem Relation Age of Onset    Cancer Father         Throat CA, Smoker       Review of Systems:  Positive in BOLD    CONST: Fever, weight loss, fatigue or chills  GI: Nausea, vomiting, abdominal pain, change in bowel habits, hematochezia, melena, and GERD   INTEG: Dermatitis, abnormal moles  HEENT: Recent changes in vision, vertigo, epistaxis, dysphagia and hoarseness  CV: Chest pain, palpitations, HTN, edema and varicosities  RESP: Cough, shortness of breath, wheezing, hemoptysis, snoring and reactive airway disease  : Hematuria, dysuria, frequency, urgency, nocturia and stress urinary incontinence   MS: Weakness, joint pain and arthritis  ENDO: Diabetes, thyroid disease, polyuria, polydipsia, polyphagia, poor wound healing, heat intolerance, cold intolerance  LYMPH/HEME: Anemia, bruising and history of blood transfusions  NEURO: Dizziness, headache, fainting, seizures and stroke  PSYCH: Anxiety and depression    Physical Exam    Visit Vitals  /88 (BP 1 Location: Right arm, BP Patient Position: Sitting)   Pulse 98   Temp 95.3 °F (35.2 °C) (Oral)   Ht 5' 2\" (1.575 m)   Wt 99.8 kg (220 lb)   BMI 40.24 kg/m²         General: 66 y.o.) female in no acute distress. Morbidly obese in abdomen  HEENT: Normocephalic, atraumatic, Pupils equal and reactive, nasopharynx clear, oropharynx clear and moist without lesions  NECK: Supple, no lymphadenopathy, thyromegaly, carotid bruits or jugular venous distension. trachea midline  RESP: Clear to auscultation bilaterally, no wheezes, rhonchi, or rales, normal respiratory excursion  CV: Regular rate and rhythm, no murmurs, rubs or gallops. 3+/4 pulses in bilateral dorsalis pedis and posterior tibialis. No distal edema or varicosities.   ABD: Soft, nontender, nondistended, normoactive bowel sounds, no hernias, no hepatosplenomegaly, easily palpable costal margins, gynecoid distribution, healed lap scars and removed port site  Extremities: Warm, well perfused, no tenderness or swelling, normal gait/station  Neuro: Sensation and strength grossly intact and symmetrical  Psych: Alert and oriented to person, place, and time. Studies:    LABS: within normal limits except for hypovit D  EKG: without significant abnormalities  NUTRITIONAL EVALUATION has deemed the patient a good candidate for weight loss surgery. She had lost one lbs on the WLT but is currently at the same weight as the start of her WLT. PSYCHIATRIC EVALUATION has deemed the patient a good candidate for weight loss surgery    Impression:    Tiff Peters is a 66 y.o. female who is suffering from morbid obesity and comorbidities including hypertension and obstructive sleep apnea - CPAPwho would benefit from bariatric surgery. We've discussed the restrictive and malabsorptive nature of the gastric bypass and compared and contrasted with the sleeve gastrectomy. The patient understands the likelihood of losing approximately 80% of their excess weight in 12 to 18 months. She also understands the risks including but not limited to bleeding, infection, need for reoperation, anastomotic ulcers, leaks and strictures, bowel obstruction secondary to adhesions and internal hernias, DVT, PE, heart attack, stroke, and death. Patient also understands risks of inadequate weight loss, excess weight loss, vitamin insufficiency, protein malnutrition, excess skin, and loss of hair. We have reviewed the components of a successful postoperative course including requirement for a high protein, low carbohydrate diet, 60 oz a day of zero calorie liquids, daily vitamin supplementation, daily exercise, regular follow-up, and participation in support groups. We have reviewed the preoperative liver shrinking clear liquid diet, as well as reviewed any medication changes required while on the clear liquid diet. In addition, the patient understands that all medications to be taken during the first 8 weeks postoperatively can be taken whole as long as the medication is approximately the size of a Bubba 325 mg aspirin tablet in size.   The patient further understands that it is his/her responsibility to review these and verify with their primary care doctor and pharmacist that all medications are of the appropriate size. We will schedule the patient for laparoscopic gastric bypass  in the near future. She is aware that she is at higher risk for complications to specifically include bleeding, injury to surrounding structures and leakage due to the fact that this is a conversion from a prior eroded band. She further understands this will require longer operative time and a higher risk for open conversion and/or drain placement.

## 2019-01-14 NOTE — PATIENT INSTRUCTIONS
Take the following medications as noted. - If the medication is circled, take with a sip of water on the morning of surgery prior to reporting to the hospital  - If the medication has a line drawn through it, do not take that medication after starting your liquid diet before surgery  - If the medication has a star beside it, change its dosing as written beside it on the date written beside it. Current Outpatient Medications   Medication Sig Dispense Refill    cpap machine kit by Does Not Apply route.  hydroCHLOROthiazide (MICROZIDE) 12.5 mg capsule 12.5 mg.      naltrexone HCl/bupropion HCl (CONTRAVE PO) Take  by mouth.  tretinoin (RETIN-A) 0.025 % topical cream Use 1 Application to affected area Every Night at Bedtime. APPLY 20-30 MINUTES AFTER WASHING/DRYING SKIN. FOR ACNE      oxyCODONE-acetaminophen (PERCOCET) 5-325 mg per tablet Take 1-2 Tabs by mouth every four (4) hours as needed for Pain. Max Daily Amount: 12 Tabs.  40 Tab 0

## 2019-01-14 NOTE — PROGRESS NOTES
Preop History and Physical Exam:    Jennyfer Tirado is a 66 y.o. female who comes into the office today after completing the entirety of the bariatric preoperative protocol satisfactorily. She had an eroded lap band removed by me in 2017 via gastrotomy. She enrolled in the program after gaining weight last summer and has completed the program now. Today, the patient is Height: 5' 2\" (157.5 cm) tall, Weight: 99.8 kg (220 lb) lbs for a Body mass index is 40.24 kg/m². It is due to their severe obesity, which is further complicated by hypertension and obstructive sleep apnea - CPAP  that the patient is now seeking out bariatric surgery, specifically, the gastric bypass      Past Medical History:   Diagnosis Date    Constipation     Coxsackie carditis 1986    Sleep apnea        Past Surgical History:   Procedure Laterality Date    ADJUSTMENT GASTRIC BAND      HX CHOLECYSTECTOMY  2018    HX GI  11/20/2017    removal of Lap Band    HX HEENT  20007    Plastic surgery to repair face after dog bite    HX LIPECTOMY         Current Outpatient Medications   Medication Sig Dispense Refill    cpap machine kit by Does Not Apply route.  hydroCHLOROthiazide (MICROZIDE) 12.5 mg capsule 12.5 mg.      naltrexone HCl/bupropion HCl (CONTRAVE PO) Take  by mouth.  tretinoin (RETIN-A) 0.025 % topical cream Use 1 Application to affected area Every Night at Bedtime. APPLY 20-30 MINUTES AFTER WASHING/DRYING SKIN. FOR ACNE      oxyCODONE-acetaminophen (PERCOCET) 5-325 mg per tablet Take 1-2 Tabs by mouth every four (4) hours as needed for Pain. Max Daily Amount: 12 Tabs.  40 Tab 0       No Known Allergies    Social History     Tobacco Use    Smoking status: Never Smoker    Smokeless tobacco: Never Used   Substance Use Topics    Alcohol use: Yes     Comment: occ    Drug use: No       Family History   Problem Relation Age of Onset    Cancer Father         Throat CA, Smoker       Review of Systems:  Positive in BOLD    CONST: Fever, weight loss, fatigue or chills  GI: Nausea, vomiting, abdominal pain, change in bowel habits, hematochezia, melena, and GERD   INTEG: Dermatitis, abnormal moles  HEENT: Recent changes in vision, vertigo, epistaxis, dysphagia and hoarseness  CV: Chest pain, palpitations, HTN, edema and varicosities  RESP: Cough, shortness of breath, wheezing, hemoptysis, snoring and reactive airway disease  : Hematuria, dysuria, frequency, urgency, nocturia and stress urinary incontinence   MS: Weakness, joint pain and arthritis  ENDO: Diabetes, thyroid disease, polyuria, polydipsia, polyphagia, poor wound healing, heat intolerance, cold intolerance  LYMPH/HEME: Anemia, bruising and history of blood transfusions  NEURO: Dizziness, headache, fainting, seizures and stroke  PSYCH: Anxiety and depression    Physical Exam    Visit Vitals  /88 (BP 1 Location: Right arm, BP Patient Position: Sitting)   Pulse 98   Temp 95.3 °F (35.2 °C) (Oral)   Ht 5' 2\" (1.575 m)   Wt 99.8 kg (220 lb)   BMI 40.24 kg/m²         General: 66 y.o.) female in no acute distress. Morbidly obese in abdomen  HEENT: Normocephalic, atraumatic, Pupils equal and reactive, nasopharynx clear, oropharynx clear and moist without lesions  NECK: Supple, no lymphadenopathy, thyromegaly, carotid bruits or jugular venous distension. trachea midline  RESP: Clear to auscultation bilaterally, no wheezes, rhonchi, or rales, normal respiratory excursion  CV: Regular rate and rhythm, no murmurs, rubs or gallops. 3+/4 pulses in bilateral dorsalis pedis and posterior tibialis. No distal edema or varicosities.   ABD: Soft, nontender, nondistended, normoactive bowel sounds, no hernias, no hepatosplenomegaly, easily palpable costal margins, gynecoid distribution, healed lap scars and removed port site  Extremities: Warm, well perfused, no tenderness or swelling, normal gait/station  Neuro: Sensation and strength grossly intact and symmetrical  Psych: Alert and oriented to person, place, and time. Studies:    LABS: within normal limits except for hypovit D  EKG: without significant abnormalities  NUTRITIONAL EVALUATION has deemed the patient a good candidate for weight loss surgery. She had lost one lbs on the WLT but is currently at the same weight as the start of her WLT. PSYCHIATRIC EVALUATION has deemed the patient a good candidate for weight loss surgery    Impression:    Katrina Magallanes is a 66 y.o. female who is suffering from morbid obesity and comorbidities including hypertension and obstructive sleep apnea - CPAPwho would benefit from bariatric surgery. We've discussed the restrictive and malabsorptive nature of the gastric bypass and compared and contrasted with the sleeve gastrectomy. The patient understands the likelihood of losing approximately 80% of their excess weight in 12 to 18 months. She also understands the risks including but not limited to bleeding, infection, need for reoperation, anastomotic ulcers, leaks and strictures, bowel obstruction secondary to adhesions and internal hernias, DVT, PE, heart attack, stroke, and death. Patient also understands risks of inadequate weight loss, excess weight loss, vitamin insufficiency, protein malnutrition, excess skin, and loss of hair. We have reviewed the components of a successful postoperative course including requirement for a high protein, low carbohydrate diet, 60 oz a day of zero calorie liquids, daily vitamin supplementation, daily exercise, regular follow-up, and participation in support groups. We have reviewed the preoperative liver shrinking clear liquid diet, as well as reviewed any medication changes required while on the clear liquid diet. In addition, the patient understands that all medications to be taken during the first 8 weeks postoperatively can be taken whole as long as the medication is approximately the size of a Bubba 325 mg aspirin tablet in size.   The patient further understands that it is his/her responsibility to review these and verify with their primary care doctor and pharmacist that all medications are of the appropriate size. We will schedule the patient for laparoscopic gastric bypass  in the near future. She is aware that she is at higher risk for complications to specifically include bleeding, injury to surrounding structures and leakage due to the fact that this is a conversion from a prior eroded band. She further understands this will require longer operative time and a higher risk for open conversion and/or drain placement.

## 2019-01-14 NOTE — LETTER
1/14/2019 11:25 AM 
 
Patient:  Hamzah Lozano YOB: 1960 Date of Visit: 1/14/2019 Adolph Vincent MD 
[address] [facility] [address] VIA Facsimile: [phone] Dear Adolph Vincent MD, Thank you for referring Ms. Hoang Price to [place] [address] for evaluation and treatment. Below are the relevant portions of my assessment and plan of care. Preop History and Physical Exam: 
 
Hamzah Lozano is a 66 y.o. female who comes into the office today after completing the entirety of the bariatric preoperative protocol satisfactorily. She had an eroded lap band removed by me in 2017 via gastrotomy. She enrolled in the program after gaining weight last summer and has completed the program now. Today, the patient is Height: 5' 2\" (157.5 cm) tall, Weight: 99.8 kg (220 lb) lbs for a Body mass index is 40.24 kg/m². It is due to their severe obesity, which is further complicated by hypertension and obstructive sleep apnea - CPAP  that the patient is now seeking out bariatric surgery, specifically, the gastric bypass Past Medical History:  
Diagnosis Date  Constipation  Coxsackie carditis 1986  Sleep apnea Past Surgical History:  
Procedure Laterality Date  ADJUSTMENT GASTRIC BAND  HX CHOLECYSTECTOMY  2018  HX GI  11/20/2017  
 removal of Lap Band  HX HEENT  [id number] Plastic surgery to repair face after dog bite  HX LIPECTOMY Current Outpatient Medications Medication Sig Dispense Refill  cpap machine kit by Does Not Apply route.  hydroCHLOROthiazide (MICROZIDE) 12.5 mg capsule 12.5 mg.    
 naltrexone HCl/bupropion HCl (CONTRAVE PO) Take  by mouth.  tretinoin (RETIN-A) 0.025 % topical cream Use 1 Application to affected area Every Night at Bedtime. APPLY 20-30 MINUTES AFTER WASHING/DRYING SKIN.  FOR ACNE    
  oxyCODONE-acetaminophen (PERCOCET) 5-325 mg per tablet Take 1-2 Tabs by mouth every four (4) hours as needed for Pain. Max Daily Amount: 12 Tabs. 40 Tab 0 No Known Allergies Social History Tobacco Use  Smoking status: Never Smoker  Smokeless tobacco: Never Used Substance Use Topics  Alcohol use: Yes Comment: occ  Drug use: No  
 
 
Family History Problem Relation Age of Onset  Cancer Father Throat CA, Smoker Review of Systems:  Positive in BOLD 
 
CONST: Fever, weight loss, fatigue or chills GI: Nausea, vomiting, abdominal pain, change in bowel habits, hematochezia, melena, and GERD INTEG: Dermatitis, abnormal moles HEENT: Recent changes in vision, vertigo, epistaxis, dysphagia and hoarseness CV: Chest pain, palpitations, HTN, edema and varicosities RESP: Cough, shortness of breath, wheezing, hemoptysis, snoring and reactive airway disease : Hematuria, dysuria, frequency, urgency, nocturia and stress urinary incontinence MS: Weakness, joint pain and arthritis ENDO: Diabetes, thyroid disease, polyuria, polydipsia, polyphagia, poor wound healing, heat intolerance, cold intolerance LYMPH/HEME: Anemia, bruising and history of blood transfusions NEURO: Dizziness, headache, fainting, seizures and stroke PSYCH: Anxiety and depression Physical Exam 
 
Visit Vitals /88 (BP 1 Location: Right arm, BP Patient Position: Sitting) Pulse 98 Temp 95.3 °F (35.2 °C) (Oral) Ht 5' 2\" (1.575 m) Wt 99.8 kg (220 lb) BMI 40.24 kg/m² General: 66 y.o.) female in no acute distress. Morbidly obese in abdomen HEENT: Normocephalic, atraumatic, Pupils equal and reactive, nasopharynx clear, oropharynx clear and moist without lesions NECK: Supple, no lymphadenopathy, thyromegaly, carotid bruits or jugular venous distension. trachea midline RESP: Clear to auscultation bilaterally, no wheezes, rhonchi, or rales, normal respiratory excursion CV: Regular rate and rhythm, no murmurs, rubs or gallops. 3+/4 pulses in bilateral dorsalis pedis and posterior tibialis. No distal edema or varicosities. ABD: Soft, nontender, nondistended, normoactive bowel sounds, no hernias, no hepatosplenomegaly, easily palpable costal margins, gynecoid distribution, healed lap scars and removed port site Extremities: Warm, well perfused, no tenderness or swelling, normal gait/station Neuro: Sensation and strength grossly intact and symmetrical 
Psych: Alert and oriented to person, place, and time. Studies: LABS: within normal limits except for hypovit D 
EKG: without significant abnormalities NUTRITIONAL EVALUATION has deemed the patient a good candidate for weight loss surgery. She had lost one lbs on the WLT but is currently at the same weight as the start of her WLT. PSYCHIATRIC EVALUATION has deemed the patient a good candidate for weight loss surgery Impression: 
 
Maru Kohler is a 66 y.o. female who is suffering from morbid obesity and comorbidities including hypertension and obstructive sleep apnea - CPAPwho would benefit from bariatric surgery. We've discussed the restrictive and malabsorptive nature of the gastric bypass and compared and contrasted with the sleeve gastrectomy. The patient understands the likelihood of losing approximately 80% of their excess weight in 12 to 18 months. She also understands the risks including but not limited to bleeding, infection, need for reoperation, anastomotic ulcers, leaks and strictures, bowel obstruction secondary to adhesions and internal hernias, DVT, PE, heart attack, stroke, and death. Patient also understands risks of inadequate weight loss, excess weight loss, vitamin insufficiency, protein malnutrition, excess skin, and loss of hair.   We have reviewed the components of a successful postoperative course including requirement for a high protein, low carbohydrate diet, 60 oz a day of zero calorie liquids, daily vitamin supplementation, daily exercise, regular follow-up, and participation in support groups. We have reviewed the preoperative liver shrinking clear liquid diet, as well as reviewed any medication changes required while on the clear liquid diet. In addition, the patient understands that all medications to be taken during the first 8 weeks postoperatively can be taken whole as long as the medication is approximately the size of a Bubba 325 mg aspirin tablet in size. The patient further understands that it is his/her responsibility to review these and verify with their primary care doctor and pharmacist that all medications are of the appropriate size. We will schedule the patient for laparoscopic gastric bypass  in the near future. She is aware that she is at higher risk for complications to specifically include bleeding, injury to surrounding structures and leakage due to the fact that this is a conversion from a prior eroded band. She further understands this will require longer operative time and a higher risk for open conversion and/or drain placement. Thank you very much for your referral of Ms. Griffin Butts. If you have questions, please do not hesitate to call me. I look forward to following Ms. Sundeep Torres along with you and will keep you updated as to her progress.   
 
 
 
 
Sincerely, 
 
 
Brigid Miranda MD

## 2019-01-14 NOTE — PROGRESS NOTES
Chief Complaint   Patient presents with    Pre-op Exam     Gastric bypass     Body mass index is 40.82 kg/m². 1. Have you been to the ER, urgent care clinic since your last visit? Hospitalized since your last visit? No    2. Have you seen or consulted any other health care providers outside of the [address] [place] [address] since your last visit? Include any pap smears or colon screening.  No

## 2019-01-15 ENCOUNTER — DOCUMENTATION ONLY (OUTPATIENT)
Dept: MEDSURG UNIT | Age: 59
End: 2019-01-15

## 2019-01-15 ENCOUNTER — DOCUMENTATION ONLY (OUTPATIENT)
Dept: BARIATRICS/WEIGHT MGMT | Age: 59
End: 2019-01-15

## 2019-01-15 NOTE — PROGRESS NOTES
CLINICAL NUTRITION PRE-OPERATIVE EDUCATION    Patient's Name: Maine Mckeon   Age: 66 y.o. YOB: 1960   Sex: female    Education & Materials Provided:   x Excel Menu/Allowable Foods/ Shopping List   x  Supplemental Resource Guide: MVI, B12, Calcium Citrate, Vitamin D         recommendations  x  Protein Supplement Information  x  Fluid Requirements/ No Straws  x  No Caffeine or Carbonation   x  No alcohol for 1-Year Post-Op                     x  No Snacks or No Concentrated Sweets     x  Exercising   x  Support System at [facility] of Support Group meetings. Support System after surgery includes: x     x  Key Diet Principles            x  Addressed Current Habits/Changes to Make   x Patient has been educated on the liquid diet to begin 1 week prior to surgery. Attendance of support group: Yes      Summary:  Patient has completed 3 visits with me. During these nutrition visits, we focused on dietary changes, behavior changes, and the importance of establishing an exercise routine. The diet protocol that patient was prescribed emphasized on low carbohydrates (less than 100 grams per day prior to surgery and 60-80 grams of protein per day). At today's session, patient was educated on the post-op diet protocol. Patient understands the importance of keeping total fat and sugar less than 3 grams per serving. Patient is aware of the transition of the diet stages and is aware that they will be on clear liquids for 7days, followed by soft protein for 5 weeks. Patient understands the body needs ~ 60-70 grams of protein per day. During the liquid phase, patient will need 60 grams of protein from shakes. Once eating soft protein, patient will only need 1 shake per day. Patient is aware of the importance of the vitamins and protein drinks. Patient has also been educated on the pre-op liquid diet, which will range from 1-2 weeks (depending on surgeon).   Patient understands that failure to comply in pre-op liquid diet could result in surgery being canceled. Patient's 6 week post-op nutrition appointment has been scheduled for: . Ok to proceed. At this 6 week visit, RD will assess how patient is tolerating soft protein and advance to vegetables, if tolerating soft protein without difficulty. Patient will also see RD again at 9 months post-op. This visit will assess patient's compliance with current protocol, including diet, vitamins, protein shakes, and exercise. Post-op diet guidelines will be reinforced. RD is available for questions and to meet with patient outside of the 6 week and 9 month post-op visit.         Candidate for surgery: Yes  Re-evaluation Date:     Oliver Nevarez 87 RD  1/15/2019

## 2019-01-21 NOTE — PROGRESS NOTES
Patient attended pre op class with [facility] and Anju Membreno. Patient was educated on all pre op instructions below. A copy was provided. All questions were answered  7 day Pre-Op LIQUID Diet    Benefits:  o Reducing intake before surgery will shrink your liver by  depleting glycogen. (a form of stored energy)  o Reduced liver size gives better access to stomach during  surgery; which translates to a safer surgery. o Prevents the \"last supper\" syndrome  o Experiencing weight loss before the procedure encourages  post-operative compliance and \"jump-starts\" weight loss    Specifics:  o Start 7 days before surgery:  ____________  o NO SOLID FOODS!!  o Your surgery will be CANCELED if this diet is not followed!!!  o Minimum of 64oz. of fluid daily, including protein drinks.  o No added sugar or carbonated beverages  o Continue to take all your prescribed medication and your vitamin supplements during this preoperative diet phase. CLEAR LIQUIDS:   Water   Sugar free, non-carbonated beverages (crystal light, propel)   Sugar free popsicles   Sugar free Jell-O   Fat free, reduced sodium broth    Decaffeinated coffee or decaffeinated tea with artificial sweeteners. PROTEIN:   60 grams of protein daily (in liquid supplements)   Pre-made protein drinks   Protein powder added to water    3 gram rule: limit sugar and fat to less than 3 grams per 8oz.  4-6 oz. low fat/low sugar yogurt OR cottage cheese 3-4 times during the week      Following Gastric Bypass surgery you will no longer be able to take NSAIDs (Non-Steriodal Anti-Inflammatory Drugs) EVER again. NSAIDs are the leading cause of stomach ulcers following Gastric Bypass surgery. (Patients having the Gastric Sleeve will not be able to take NSAIDs for 6 weeks following surgery.)      MOST COMMONLY TAKEN    NSAIDs    to be AVOIDED!! 1. Ibuprofen  2. Advil  3. Motrin  4. Excedrin  5. Aspirin  6. Celebrex  7. Naproxen  8. Aleve  9. Voltaren  10. Mobic    Attached is an extensive list of additional NSAIDs that cannot be taken following surgery. Please be sure to review this list when you are being prescribed new medications. This list covers the majority of NSAIDs on the market. Be aware that additional NSAIDs do exist and new medications are being introduced regularly. You must be your own advocate!! Non-Steriodal Anti-Inflammatory Drugs (NSAIDs)  ? The following is a list of NSAIDS that are NEVER to be taken again after Gastric Bypass surgery    Ibuprofen which is also known as : Advil, Advil Childrens, Advil Lencho Strength, Advil Liquigel, Advil Migraine, Advil Pediatric, Childrens Ibuprofen Berry, Genpril, IBU, Midol IB, Midol Maximum Strength Cramp Formula, Dolgesic, Motrin Childrens, Motrin IB, Motrin Infant Drops, Motrin Lencho Strength, Motrin Migraine Pain, Nuprin, Migraine Liqui-gels, Ibu-Tab 200, Cap-Profen, Tab-Profen, Profen, Ibuprohm, Childrens Elixsure, IB Pro, Vicoprofen, Combunox, A-G Profen, Actiprofen, Addaprin, Advil Infants Concentrated Drops, Caldolor, [place], Q-Profen, Ibifon 600, Ibren, La, Midol Cramps & Bodyaches, [place], Justa, [place], [place], Uni-Pro, Wal-Profen    Aspirin which has the brand name: Arthritis Pain, Aspergum, Aspir-Low, Aspirin Lite Coat, Bubba Aspirin, Bufferin, Easprin, Ecotrin, Empirin, Fasprin, [place], Halfprin, [place] Aspirin, O'Brien Aspirin, Excedrin    Ketoprofen which is known as: Orudis KT, Oruvail, Actron. Sulindac which is sold as: Clinoril.     Naproxen is one of the most common NSAIDs, and is sold as: Aleve, Naprosyn, EC-Naprosyn, Naprelan, Anaprox, All Day Pain Relief, Aflaxen, All Day Relief, Anaprox-DS, Comfort Pac  With Naproxen,  Aleve Caplet, Aleve Easy Open Arthritis, Aleve Gelcap,  Anaprox-DS, EC-Naprosyn, Leader Naproxen Sodium, Midol Extended Relief, Naprelan 375?, Naprelan 500?, Naprelan 750?, Prevacid NapraPac 375,  Prevacid NapraPac, Naprapac, Vimovo. Etodolac is sold under the brand name: Lodine XL, Lodine. Fenoprofen can be found on the market as: Nalfon, Nalfon 200. Diclofenac sold as: Arthrotec, Cataflam, Voltaren, Cambia, Voltaren-XR, Zipsor. Flurbiprofen sold as: Asaid. Ketorolac is sold under the brand name: Sprix, Toradol, Toradol IM, Toradol IV/IM. Piroxicam is found on the market as: Feldene. Indomethacin known as: Indocin SR, Indocin, Indo-Linn, Indomethegan, Indocin IV. Mefenamic Acid sold as: Ponstel. Meloxicam is sold under the brand name: Mobic    Nabumetone which is sold as: Relafen. Oxaprozin which has the brand name: Daypro    Ketoprofen which has the brand name: Actron, Orudis KT, Orudis, Oruvail    Famotidine and Ibuprofen can be found as: Duexis    Meclofenamate sold as: Meclomen    Tolmetin which can be found on the marked as: Tolectin, Tolectin 600, Tolectin DS    Salsalate which is sold as: Disalcid. Celecoxib which is sold as: Celebrex      Patients name: ________________________________  Date of surgery: ____________    Pre-op instructions:  1. Shower with the antibacterial soap  the 3 days prior to surgery. 2. Pre-admission testing will contact you 1 week before surgery  3. [facility] will contact you the day before surgery to confirm your surgery time.  Email or call your surgery scheduler if you have not heard from them by 3pm  4. Nothing to eat or drink (NPO) after midnight the night before surgery.  Take any evening medications by 11pm  5. Wear comfortable clothing. 6. Do not bring any valuables. 7. Bring insurance card, prescription card, photo ID and credit card to the hospital.  **Discuss all home medications with your surgeon at you pre-op appointment. Your surgeon will inform you of what medications to stop before surgery and what medications to take the day of surgery.     **STOP all NSAIDS (Ibuprofen, Aleve, Advil, Naprosyn etc.) and Aspirin 7 days before your surgery      Important Information:  1. No lifting over 15 lbs. for 6 weeks post-op  2. No driving for 8-00 days after surgery - must be off pain medication. 3. No smoking EVER again! 4. You will NOT be able to take any Non-Steroidal Anti-inflammatories (NSAIDS), Aspirin or Steroids  a. Bypass/revision patients - EVER again. (Tylenol only)  b. Sleeve patients - 6 weeks after surgery  5. Pulse/temperature- twice daily for 2 weeks post-op   6. Avoid pregnancy for 18 months  7. Key Diet principles:  a. Vitamin/mineral supplements-[place] Complete, Calcium citrate, Vitamin D3, Vitamin B12  b.  Protein supplements - 60-70 grams/day  c. Minimum 64 oz. fluid per day      What to Expect in the Hospital:  Pre-op area:  o Emergency Room  take elevator 2nd floor  o Arrive 2 hours before surgery  o Lovenox (blood thinner)  o Incentive Spirometer  o SCDs  o Sign consent  o Anesthesia  Start IV    Operating Room:    o Gastric bypass: 2- 2 ½ hours  o Sleeve gastrectomy: 1-1 ½ hours  o Revision: 2-3 hours    PACU(Post Anesthesia Care Unit):  o Nasal cannula  o EKG monitor  o Blood pressure cuff  o Pulse Ox  o Hall Catheter  o SCDs  o No family allowed  o Minimum one hour      2 Central (Day of Surgery):  o Private room  o 1-2 oz. ice chips per hour   o Pain Management ( Three Tier)  Tylenol given first- 650 mg PO  Oxycodone 5 mg PO   Dilaudid IV  Remember other alternatives to pain medication   Get patient out of bed and walking this helps tremendously  Position change or get patient up to the chair  Ice Raymon to Abdomen  o Void  o Walk within 4 hours  o One family member can spend the night (must 18 years or older)  o Cell phone/computers - OK    2 Central (Post-op Day 1/Post-op Day 2):  o Discontinue -nasal cannula and heart rate monitor  o Start bariatric clear liquid diet - water, crystal light, broth, Jell-O and protein supplement  o Slowly increase to 3 oz. clear liquids and 1 oz. protein supplement per hour  o Oral pain medication as needed for pain - communicate with your nurse  o Goal:  48 to 64 ounces, clear liquid per day preferable water  o Discharge instructions/Lovenox self-injection instructions   o WALK & WATER    Post-op Goals:  1. Void within 8 hours of your catheter being removed  2. Pain is well controlled with oral pain medication  3. Nausea is under control/No vomiting  4. Tolerating 3 oz. of clear liquids and 1 oz. protein supplement per hour for 3 consecutive hours. Post-op Follow-up Labs will need to be completed 1-2 weeks BEFORE your 3 month, 6 month and yearly visits. These labs are required and your appointment will be rescheduled if they are not completed. My surgical procedure and post-operative hospital stay has been discussed with me and I have been given the opportunity to ask any questions I may have. Patient Signature: ____________________________  Date: _____________________    THINGS I NEED TO KNOW BEFORE SURGERY  1. How many ounces of fluid will you need to drink every day after surgery? _______________    2. List 3 examples of bariatric clear liquids. ________________________  ________________________  ________________________  3. What is the 3 gram rule?   ______________________________________________________________      4. What is the 30 minute rule?  ______________________________________________________________      5. What are examples of food you will be able to eat on the bariatric soft and moist diet?  _________________________  _________________________  _________________________  6. After surgery I will be able to use a straw. TRUE    FALSE    7. After surgery I will NOT be able to drink carbonated beverages. TRUE    FALSE  8. After surgery I will be able to drink caffeine. TRUE   FALSE    9.  What 4 vitamins will you take after surgery?  ______________________           _________________________  _____________________ _________________________  10. How much exercise should you get daily? _________________    11. How long should it take you to eat a meal? ________________    12. The Calcium I have purchased is: ______________________. This has ________ mg per serving. I will need to take this ________ times a day. 13. The protein drink I have decided on is: ______________. This has _________ grams of protein. I will need to drink ______ of my protein drinks during the full liquid phase and _____ during the soft protein phase. My protein drinks will give me ______ ounces of fluid. 14. After having a sleeve gastrectomy I will not be able to take NSAIDs (Non-Steroidal Anti-inflammatory Drugs) for ______ weeks. 15. After having a gastric bypass I will not be able to take NSAIDs (Non-Steroidal Anti-Inflammatory Drugs) for _____________. PRE-OP CHECKLIST    THINGS TO DO AT MY PRE-OP APPOINTMENT WITH MY SURGEON:  ? Discuss ALL my current medications with my surgeon. Know what medications needs to be stopped before surgery AND what medications need to be taken on the morning of surgery. ? blood pressure/diuretic medications. ? Coumadin, Plavix or other blood thinners    ? Stop the following diabetic medications (if applicable): ____________________________________________________on: ______________  ? Use Regular Insulin (Novolog Pen) according to the following sliding scale: Insulin Sliding Scale  Blood sugar:  Amount of insulin:  Under 150  no insulin  150-200  2 units  201-250  4 units  251-300  6 units  301-350  8 units  351-400  10 units  400 or greater 12 units and call physician [phone]    THINGS TO DO FOLLOWING the PRE-OP CLASS:  ? Read through all information given to me by:  ? My dietitian Minor Stevenson or Angel)  ? Ivonne/Cassidy at the Pre-op class    ?  Contact my insurance company to find out the out of pocket cost of Lovenox (enoxaparin). $____________      THINGS TO DO BEFORE SURGERY:    ? Start the pre-op liquid diet on: ___________    ? Stop all NSAIDS (see attached sheet with list of NSAIDs) and Aspirin 7 days before my surgery: ___________  ? Contact my doctor(PCP or surgeon) regarding stopping Coumadin, Plavix or other blood thinners    ? Purchase bariatric clear liquids (Crystal Lite, sugar free Jell-O, broth, sugar free popsicles, protein supplement) and bariatric soft and moist foods (low fat yogurt, cottage cheese, eggs, tuna, fish, chicken) so that Im prepared when I get home from the hospital.     ? Purchase all vitamins that will be required following surgery. 1. Chewable multivitamin - 2 per day(ex: Flintstones)  2. Calcium Citrate - 1500mg (500mg 3 times a day)  3. Vitamin B12 - 1000mcg daily  4. Vitamin D3 - 5000IU daily    ? Create a system to keep track of how many ounces of fluid I am drinking daily. ? Post-op GOAL: 64oz per day    ? Purchase a protein supplement that I like:  ? Brand: ______________    ? Ounces: __________   ?  Grams of protein per serving: _________

## 2019-01-22 ENCOUNTER — ANESTHESIA EVENT (OUTPATIENT)
Dept: SURGERY | Age: 59
DRG: 621 | End: 2019-01-22
Payer: COMMERCIAL

## 2019-01-23 ENCOUNTER — ANESTHESIA (OUTPATIENT)
Dept: SURGERY | Age: 59
DRG: 621 | End: 2019-01-23
Payer: COMMERCIAL

## 2019-01-23 ENCOUNTER — HOSPITAL ENCOUNTER (INPATIENT)
Age: 59
LOS: 2 days | Discharge: HOME | DRG: 621 | End: 2019-01-25
Attending: SURGERY | Admitting: SURGERY
Payer: COMMERCIAL

## 2019-01-23 DIAGNOSIS — E66.01 MORBID OBESITY (HCC): ICD-10-CM

## 2019-01-23 DIAGNOSIS — Z98.84 H/O LAPAROSCOPIC ADJUSTABLE GASTRIC BANDING: Primary | ICD-10-CM

## 2019-01-23 LAB
BUN BLD-MCNC: 13 MG/DL (ref 7–18)
CHLORIDE BLD-SCNC: 106 MMOL/L (ref 100–108)
GLUCOSE BLD STRIP.AUTO-MCNC: 89 MG/DL (ref 74–106)
HCT VFR BLD CALC: 40 % (ref 36–49)
HGB BLD-MCNC: 13.6 G/DL (ref 12–16)
POTASSIUM BLD-SCNC: 4.9 MMOL/L (ref 3.5–5.5)
SODIUM BLD-SCNC: 141 MMOL/L (ref 136–145)

## 2019-01-23 PROCEDURE — 77030013567 HC DRN WND RESERV BARD -A: Performed by: SURGERY

## 2019-01-23 PROCEDURE — 77030009968 HC RELD STPLR ENDOSC J&J -D: Performed by: SURGERY

## 2019-01-23 PROCEDURE — 77030032490 HC SLV COMPR SCD KNE COVD -B: Performed by: SURGERY

## 2019-01-23 PROCEDURE — 77030027138 HC INCENT SPIROMETER -A

## 2019-01-23 PROCEDURE — 74011250636 HC RX REV CODE- 250/636

## 2019-01-23 PROCEDURE — 74011000272 HC RX REV CODE- 272: Performed by: SURGERY

## 2019-01-23 PROCEDURE — 76210000016 HC OR PH I REC 1 TO 1.5 HR: Performed by: SURGERY

## 2019-01-23 PROCEDURE — 74011250636 HC RX REV CODE- 250/636: Performed by: NURSE ANESTHETIST, CERTIFIED REGISTERED

## 2019-01-23 PROCEDURE — 77030008437 HC REINF STRP REINF SEMGD WLGO -C: Performed by: SURGERY

## 2019-01-23 PROCEDURE — 0D164ZA BYPASS STOMACH TO JEJUNUM, PERCUTANEOUS ENDOSCOPIC APPROACH: ICD-10-PCS | Performed by: SURGERY

## 2019-01-23 PROCEDURE — 77030008477 HC STYL SATN SLP COVD -A: Performed by: ANESTHESIOLOGY

## 2019-01-23 PROCEDURE — 0FN04ZZ RELEASE LIVER, PERCUTANEOUS ENDOSCOPIC APPROACH: ICD-10-PCS | Performed by: SURGERY

## 2019-01-23 PROCEDURE — 0DNU4ZZ RELEASE OMENTUM, PERCUTANEOUS ENDOSCOPIC APPROACH: ICD-10-PCS | Performed by: SURGERY

## 2019-01-23 PROCEDURE — 74011250636 HC RX REV CODE- 250/636: Performed by: SURGERY

## 2019-01-23 PROCEDURE — 77030008598 HC TRCR ENDOSC BLDLS J&J -B: Performed by: SURGERY

## 2019-01-23 PROCEDURE — 74011000250 HC RX REV CODE- 250: Performed by: SURGERY

## 2019-01-23 PROCEDURE — 0DN64ZZ RELEASE STOMACH, PERCUTANEOUS ENDOSCOPIC APPROACH: ICD-10-PCS | Performed by: SURGERY

## 2019-01-23 PROCEDURE — 82947 ASSAY GLUCOSE BLOOD QUANT: CPT

## 2019-01-23 PROCEDURE — 0BNT4ZZ RELEASE DIAPHRAGM, PERCUTANEOUS ENDOSCOPIC APPROACH: ICD-10-PCS | Performed by: SURGERY

## 2019-01-23 PROCEDURE — 77030035051: Performed by: SURGERY

## 2019-01-23 PROCEDURE — 77030036732 HC RELD STPLR VASC J&J -F: Performed by: SURGERY

## 2019-01-23 PROCEDURE — 77030002996 HC SUT SLK J&J -A: Performed by: SURGERY

## 2019-01-23 PROCEDURE — 77030018831 HC SOL IRR H20 BAXT -A: Performed by: SURGERY

## 2019-01-23 PROCEDURE — 77030033639 HC SHR ENDO COAG HARM 36 J&J -E: Performed by: SURGERY

## 2019-01-23 PROCEDURE — 65270000029 HC RM PRIVATE

## 2019-01-23 PROCEDURE — 77030033271 HC TRCR ENDOSC EPATH2 J&J -B: Performed by: SURGERY

## 2019-01-23 PROCEDURE — 77030037892: Performed by: SURGERY

## 2019-01-23 PROCEDURE — 77030027491 HC SHR ENDOSC COVD -B: Performed by: SURGERY

## 2019-01-23 PROCEDURE — 77030013079 HC BLNKT BAIR HGGR 3M -A: Performed by: ANESTHESIOLOGY

## 2019-01-23 PROCEDURE — 0DJ08ZZ INSPECTION OF UPPER INTESTINAL TRACT, VIA NATURAL OR ARTIFICIAL OPENING ENDOSCOPIC: ICD-10-PCS | Performed by: SURGERY

## 2019-01-23 PROCEDURE — 77030002916 HC SUT ETHLN J&J -A: Performed by: SURGERY

## 2019-01-23 PROCEDURE — 77030035048 HC TRCR ENDOSC OPTCL COVD -B: Performed by: SURGERY

## 2019-01-23 PROCEDURE — 74011250637 HC RX REV CODE- 250/637: Performed by: SURGERY

## 2019-01-23 PROCEDURE — 77030039266 HC ADH SKN EXOFIN S2SG -A: Performed by: SURGERY

## 2019-01-23 PROCEDURE — 77030016151 HC PROTCTR LNS DFOG COVD -B: Performed by: SURGERY

## 2019-01-23 PROCEDURE — 77030008683 HC TU ET CUF COVD -A: Performed by: ANESTHESIOLOGY

## 2019-01-23 PROCEDURE — 77030012407 HC DRN WND BARD -B: Performed by: SURGERY

## 2019-01-23 PROCEDURE — 76060000039 HC ANESTHESIA 4 TO 4.5 HR: Performed by: SURGERY

## 2019-01-23 PROCEDURE — 77010033678 HC OXYGEN DAILY

## 2019-01-23 PROCEDURE — 77030002933 HC SUT MCRYL J&J -A: Performed by: SURGERY

## 2019-01-23 PROCEDURE — 77030005518 HC CATH URETH FOL 2W BARD -B: Performed by: SURGERY

## 2019-01-23 PROCEDURE — 74011000250 HC RX REV CODE- 250

## 2019-01-23 PROCEDURE — 77030027876 HC STPLR ENDOSC FLX PWR J&J -G1: Performed by: SURGERY

## 2019-01-23 PROCEDURE — 77030031139 HC SUT VCRL2 J&J -A: Performed by: SURGERY

## 2019-01-23 PROCEDURE — 76010000135 HC OR TIME 4 TO 4.5 HR: Performed by: SURGERY

## 2019-01-23 RX ORDER — LIDOCAINE HYDROCHLORIDE 20 MG/ML
INJECTION, SOLUTION EPIDURAL; INFILTRATION; INTRACAUDAL; PERINEURAL AS NEEDED
Status: DISCONTINUED | OUTPATIENT
Start: 2019-01-23 | End: 2019-01-23 | Stop reason: HOSPADM

## 2019-01-23 RX ORDER — SUCCINYLCHOLINE CHLORIDE 20 MG/ML
INJECTION INTRAMUSCULAR; INTRAVENOUS AS NEEDED
Status: DISCONTINUED | OUTPATIENT
Start: 2019-01-23 | End: 2019-01-23 | Stop reason: HOSPADM

## 2019-01-23 RX ORDER — ONDANSETRON 2 MG/ML
4 INJECTION INTRAMUSCULAR; INTRAVENOUS ONCE
Status: COMPLETED | OUTPATIENT
Start: 2019-01-23 | End: 2019-01-23

## 2019-01-23 RX ORDER — SODIUM CHLORIDE, SODIUM LACTATE, POTASSIUM CHLORIDE, CALCIUM CHLORIDE 600; 310; 30; 20 MG/100ML; MG/100ML; MG/100ML; MG/100ML
125 INJECTION, SOLUTION INTRAVENOUS CONTINUOUS
Status: DISCONTINUED | OUTPATIENT
Start: 2019-01-23 | End: 2019-01-23 | Stop reason: HOSPADM

## 2019-01-23 RX ORDER — ENOXAPARIN SODIUM 100 MG/ML
40 INJECTION SUBCUTANEOUS ONCE
Status: COMPLETED | OUTPATIENT
Start: 2019-01-23 | End: 2019-01-23

## 2019-01-23 RX ORDER — FENTANYL CITRATE 50 UG/ML
INJECTION, SOLUTION INTRAMUSCULAR; INTRAVENOUS AS NEEDED
Status: DISCONTINUED | OUTPATIENT
Start: 2019-01-23 | End: 2019-01-23 | Stop reason: HOSPADM

## 2019-01-23 RX ORDER — OXYCODONE HYDROCHLORIDE 5 MG/1
5 TABLET ORAL
Status: DISCONTINUED | OUTPATIENT
Start: 2019-01-23 | End: 2019-01-24 | Stop reason: SDUPTHER

## 2019-01-23 RX ORDER — SODIUM CHLORIDE 0.9 % (FLUSH) 0.9 %
5-40 SYRINGE (ML) INJECTION EVERY 8 HOURS
Status: DISCONTINUED | OUTPATIENT
Start: 2019-01-23 | End: 2019-01-23 | Stop reason: HOSPADM

## 2019-01-23 RX ORDER — ONDANSETRON 2 MG/ML
INJECTION INTRAMUSCULAR; INTRAVENOUS AS NEEDED
Status: DISCONTINUED | OUTPATIENT
Start: 2019-01-23 | End: 2019-01-23 | Stop reason: HOSPADM

## 2019-01-23 RX ORDER — HYDROMORPHONE HYDROCHLORIDE 1 MG/ML
INJECTION, SOLUTION INTRAMUSCULAR; INTRAVENOUS; SUBCUTANEOUS AS NEEDED
Status: DISCONTINUED | OUTPATIENT
Start: 2019-01-23 | End: 2019-01-23 | Stop reason: HOSPADM

## 2019-01-23 RX ORDER — DIPHENHYDRAMINE HYDROCHLORIDE 50 MG/ML
25 INJECTION, SOLUTION INTRAMUSCULAR; INTRAVENOUS
Status: DISCONTINUED | OUTPATIENT
Start: 2019-01-23 | End: 2019-01-25 | Stop reason: HOSPADM

## 2019-01-23 RX ORDER — DEXAMETHASONE SODIUM PHOSPHATE 4 MG/ML
INJECTION, SOLUTION INTRA-ARTICULAR; INTRALESIONAL; INTRAMUSCULAR; INTRAVENOUS; SOFT TISSUE AS NEEDED
Status: DISCONTINUED | OUTPATIENT
Start: 2019-01-23 | End: 2019-01-23 | Stop reason: HOSPADM

## 2019-01-23 RX ORDER — ACETAMINOPHEN 650 MG/1
650 SUPPOSITORY RECTAL ONCE
Status: ACTIVE | OUTPATIENT
Start: 2019-01-23 | End: 2019-01-24

## 2019-01-23 RX ORDER — FENTANYL CITRATE 50 UG/ML
50 INJECTION, SOLUTION INTRAMUSCULAR; INTRAVENOUS AS NEEDED
Status: DISCONTINUED | OUTPATIENT
Start: 2019-01-23 | End: 2019-01-23 | Stop reason: HOSPADM

## 2019-01-23 RX ORDER — ENOXAPARIN SODIUM 100 MG/ML
40 INJECTION SUBCUTANEOUS DAILY
Status: DISCONTINUED | OUTPATIENT
Start: 2019-01-24 | End: 2019-01-25 | Stop reason: HOSPADM

## 2019-01-23 RX ORDER — FAMOTIDINE 10 MG/ML
20 INJECTION INTRAVENOUS ONCE
Status: COMPLETED | OUTPATIENT
Start: 2019-01-23 | End: 2019-01-23

## 2019-01-23 RX ORDER — ONDANSETRON 2 MG/ML
4 INJECTION INTRAMUSCULAR; INTRAVENOUS
Status: DISCONTINUED | OUTPATIENT
Start: 2019-01-23 | End: 2019-01-25 | Stop reason: HOSPADM

## 2019-01-23 RX ORDER — MIDAZOLAM HYDROCHLORIDE 1 MG/ML
INJECTION, SOLUTION INTRAMUSCULAR; INTRAVENOUS AS NEEDED
Status: DISCONTINUED | OUTPATIENT
Start: 2019-01-23 | End: 2019-01-23 | Stop reason: HOSPADM

## 2019-01-23 RX ORDER — SODIUM CHLORIDE 0.9 % (FLUSH) 0.9 %
5-40 SYRINGE (ML) INJECTION AS NEEDED
Status: DISCONTINUED | OUTPATIENT
Start: 2019-01-23 | End: 2019-01-23 | Stop reason: HOSPADM

## 2019-01-23 RX ORDER — HYDROMORPHONE HYDROCHLORIDE 1 MG/ML
1 INJECTION, SOLUTION INTRAMUSCULAR; INTRAVENOUS; SUBCUTANEOUS
Status: DISCONTINUED | OUTPATIENT
Start: 2019-01-23 | End: 2019-01-24 | Stop reason: SDUPTHER

## 2019-01-23 RX ORDER — VECURONIUM BROMIDE FOR INJECTION 1 MG/ML
INJECTION, POWDER, LYOPHILIZED, FOR SOLUTION INTRAVENOUS AS NEEDED
Status: DISCONTINUED | OUTPATIENT
Start: 2019-01-23 | End: 2019-01-23 | Stop reason: HOSPADM

## 2019-01-23 RX ORDER — SODIUM CHLORIDE, SODIUM LACTATE, POTASSIUM CHLORIDE, CALCIUM CHLORIDE 600; 310; 30; 20 MG/100ML; MG/100ML; MG/100ML; MG/100ML
25 INJECTION, SOLUTION INTRAVENOUS CONTINUOUS
Status: DISCONTINUED | OUTPATIENT
Start: 2019-01-23 | End: 2019-01-23 | Stop reason: HOSPADM

## 2019-01-23 RX ORDER — HYOSCYAMINE SULFATE 0.12 MG/1
0.12 TABLET, ORALLY DISINTEGRATING ORAL
Status: DISCONTINUED | OUTPATIENT
Start: 2019-01-23 | End: 2019-01-25 | Stop reason: HOSPADM

## 2019-01-23 RX ORDER — NALOXONE HYDROCHLORIDE 0.4 MG/ML
0.4 INJECTION, SOLUTION INTRAMUSCULAR; INTRAVENOUS; SUBCUTANEOUS AS NEEDED
Status: DISCONTINUED | OUTPATIENT
Start: 2019-01-23 | End: 2019-01-25 | Stop reason: HOSPADM

## 2019-01-23 RX ORDER — SODIUM CHLORIDE, SODIUM LACTATE, POTASSIUM CHLORIDE, CALCIUM CHLORIDE 600; 310; 30; 20 MG/100ML; MG/100ML; MG/100ML; MG/100ML
150 INJECTION, SOLUTION INTRAVENOUS CONTINUOUS
Status: DISCONTINUED | OUTPATIENT
Start: 2019-01-23 | End: 2019-01-25 | Stop reason: HOSPADM

## 2019-01-23 RX ORDER — ACETAMINOPHEN 325 MG/1
650 TABLET ORAL EVERY 6 HOURS
Status: DISCONTINUED | OUTPATIENT
Start: 2019-01-23 | End: 2019-01-24

## 2019-01-23 RX ORDER — ACETAMINOPHEN 120 MG/1
SUPPOSITORY RECTAL AS NEEDED
Status: DISCONTINUED | OUTPATIENT
Start: 2019-01-23 | End: 2019-01-23 | Stop reason: HOSPADM

## 2019-01-23 RX ORDER — PROPOFOL 10 MG/ML
INJECTION, EMULSION INTRAVENOUS AS NEEDED
Status: DISCONTINUED | OUTPATIENT
Start: 2019-01-23 | End: 2019-01-23 | Stop reason: HOSPADM

## 2019-01-23 RX ORDER — HYDROMORPHONE HYDROCHLORIDE 2 MG/ML
0.5 INJECTION, SOLUTION INTRAMUSCULAR; INTRAVENOUS; SUBCUTANEOUS
Status: DISCONTINUED | OUTPATIENT
Start: 2019-01-23 | End: 2019-01-23 | Stop reason: HOSPADM

## 2019-01-23 RX ORDER — LIDOCAINE HYDROCHLORIDE 10 MG/ML
0.1 INJECTION, SOLUTION EPIDURAL; INFILTRATION; INTRACAUDAL; PERINEURAL AS NEEDED
Status: DISCONTINUED | OUTPATIENT
Start: 2019-01-23 | End: 2019-01-23 | Stop reason: HOSPADM

## 2019-01-23 RX ORDER — KETOROLAC TROMETHAMINE 30 MG/ML
15 INJECTION, SOLUTION INTRAMUSCULAR; INTRAVENOUS EVERY 6 HOURS
Status: DISPENSED | OUTPATIENT
Start: 2019-01-23 | End: 2019-01-24

## 2019-01-23 RX ADMIN — SODIUM CHLORIDE, SODIUM LACTATE, POTASSIUM CHLORIDE, AND CALCIUM CHLORIDE 25 ML/HR: 600; 310; 30; 20 INJECTION, SOLUTION INTRAVENOUS at 07:00

## 2019-01-23 RX ADMIN — PROPOFOL 150 MG: 10 INJECTION, EMULSION INTRAVENOUS at 07:33

## 2019-01-23 RX ADMIN — ONDANSETRON 4 MG: 2 INJECTION INTRAMUSCULAR; INTRAVENOUS at 17:57

## 2019-01-23 RX ADMIN — SODIUM CHLORIDE, SODIUM LACTATE, POTASSIUM CHLORIDE, AND CALCIUM CHLORIDE: 600; 310; 30; 20 INJECTION, SOLUTION INTRAVENOUS at 08:34

## 2019-01-23 RX ADMIN — VECURONIUM BROMIDE FOR INJECTION 1 MG: 1 INJECTION, POWDER, LYOPHILIZED, FOR SOLUTION INTRAVENOUS at 07:33

## 2019-01-23 RX ADMIN — KETOROLAC TROMETHAMINE 15 MG: 30 INJECTION, SOLUTION INTRAMUSCULAR at 21:43

## 2019-01-23 RX ADMIN — ENOXAPARIN SODIUM 40 MG: 40 INJECTION, SOLUTION INTRAVENOUS; SUBCUTANEOUS at 07:02

## 2019-01-23 RX ADMIN — KETOROLAC TROMETHAMINE 15 MG: 30 INJECTION, SOLUTION INTRAMUSCULAR at 14:12

## 2019-01-23 RX ADMIN — FENTANYL CITRATE 50 MCG: 50 INJECTION, SOLUTION INTRAMUSCULAR; INTRAVENOUS at 12:12

## 2019-01-23 RX ADMIN — SODIUM CHLORIDE, SODIUM LACTATE, POTASSIUM CHLORIDE, AND CALCIUM CHLORIDE 150 ML/HR: 600; 310; 30; 20 INJECTION, SOLUTION INTRAVENOUS at 12:57

## 2019-01-23 RX ADMIN — SODIUM CHLORIDE, SODIUM LACTATE, POTASSIUM CHLORIDE, AND CALCIUM CHLORIDE: 600; 310; 30; 20 INJECTION, SOLUTION INTRAVENOUS at 09:59

## 2019-01-23 RX ADMIN — LIDOCAINE HYDROCHLORIDE 50 MG: 20 INJECTION, SOLUTION EPIDURAL; INFILTRATION; INTRACAUDAL; PERINEURAL at 07:33

## 2019-01-23 RX ADMIN — ONDANSETRON 4 MG: 2 INJECTION INTRAMUSCULAR; INTRAVENOUS at 07:43

## 2019-01-23 RX ADMIN — VECURONIUM BROMIDE FOR INJECTION 4 MG: 1 INJECTION, POWDER, LYOPHILIZED, FOR SOLUTION INTRAVENOUS at 07:43

## 2019-01-23 RX ADMIN — CEFAZOLIN 3 G: 1 INJECTION, POWDER, FOR SOLUTION INTRAMUSCULAR; INTRAVENOUS; PARENTERAL at 07:36

## 2019-01-23 RX ADMIN — HYDROMORPHONE HYDROCHLORIDE 1 MG: 1 INJECTION, SOLUTION INTRAMUSCULAR; INTRAVENOUS; SUBCUTANEOUS at 08:03

## 2019-01-23 RX ADMIN — ONDANSETRON 4 MG: 2 INJECTION INTRAMUSCULAR; INTRAVENOUS at 12:06

## 2019-01-23 RX ADMIN — FAMOTIDINE 20 MG: 10 INJECTION, SOLUTION INTRAVENOUS at 07:00

## 2019-01-23 RX ADMIN — FENTANYL CITRATE 100 MCG: 50 INJECTION, SOLUTION INTRAMUSCULAR; INTRAVENOUS at 07:33

## 2019-01-23 RX ADMIN — ONDANSETRON 4 MG: 2 INJECTION INTRAMUSCULAR; INTRAVENOUS at 23:54

## 2019-01-23 RX ADMIN — MIDAZOLAM HYDROCHLORIDE 2 MG: 1 INJECTION, SOLUTION INTRAMUSCULAR; INTRAVENOUS at 07:29

## 2019-01-23 RX ADMIN — SUCCINYLCHOLINE CHLORIDE 100 MG: 20 INJECTION INTRAMUSCULAR; INTRAVENOUS at 07:33

## 2019-01-23 RX ADMIN — DEXAMETHASONE SODIUM PHOSPHATE 4 MG: 4 INJECTION, SOLUTION INTRA-ARTICULAR; INTRALESIONAL; INTRAMUSCULAR; INTRAVENOUS; SOFT TISSUE at 07:43

## 2019-01-23 RX ADMIN — HYOSCYAMINE SULFATE 0.12 MG: 0.12 TABLET, ORALLY DISINTEGRATING ORAL at 15:40

## 2019-01-23 NOTE — PROGRESS NOTES
1245 - TRANSFER - IN REPORT:    Verbal report received from Adia Boudreaux RN(name) on Zenon Flores  being received from [employer]) for routine post - op      Report consisted of patients Situation, Background, Assessment and   Recommendations(SBAR). Information from the following report(s) SBAR, Kardex, Intake/Output and MAR was reviewed with the receiving nurse. Opportunity for questions and clarification was provided. 1259 - pt received onto floor by this RN & ADT RN. Pt lightly sedated & responds to voice. VS taken upon arrival. Pt was stating 93% on 2L NC. ADT RN increased o2 to 4L & pt saturation increased to 95%. Ice pack applied to abdomen, ice chips on bedside table, white board updated, will cont to monitor. 1345 - ambulated pt to [address] 150 urine output recorded. Pt still drowsy. Pt back in bed. JOHN drain emptied. Will cont to montior. 1540 - pt ambulated to [address] Pt cont to be drowsy & has gait disturbance during ambulation. Linen changed by this RN & Jorge Soto RN. JOHN drain emptied. Pt ambulated in room to recliner. Pt sitting up w/NAD at this time. Pt c/o nausea. Cystospaz administered. Will cont to monitor. 1726 - during rounds, this RN found pt to be in bed when she was left in recliner during last hourly round. Pt states she ambulated herself. Discussed w/pt the importance of calling for help. Pt verbalized understanding but pt cont to be drowsy. 1757 - prn zofran administered. Pt laying in bed. NAD noted, will cont to monitor. 1830 - pt ambulating in room & ambulated to bathroom. Pt states the prn medication \"finally helped\". Encouraged further ambulation. 1935 - Bedside and Verbal shift change report given to Lukasz Ma RN (oncoming nurse) by Theo Whitmore RN (offgoing nurse). Report included the following information SBAR, Kardex, Intake/Output and MAR.

## 2019-01-23 NOTE — INTERVAL H&P NOTE
H&P Update:  Kayla Zhang was seen and examined. History and physical has been reviewed. The patient has been examined.  There have been no significant clinical changes since the completion of the originally dated History and Physical.    Signed By: Lyndon Warren MD     January 23, 2019 7:27 AM

## 2019-01-23 NOTE — OP NOTES
DATE: 1/23/2019    PREOPERATIVE DIAGNOSIS: Clinically severe obesity, body mass index of 40,   comorbidities of  hypertension and obstructive sleep apnea - CPAP      . POSTOPERATIVE DIAGNOSIS: Clinically severe obesity, body mass index of 40,   comorbidities of  hypertension and obstructive sleep apnea - CPAP        PROCEDURES: Laparoscopic Amarilis-en-Y gastric bypass with 374 cm retrocolic   antegastric Amarilis limb, 40 cm biliopancreatic limb, 15 ml    tubularized gastric pouch applied to the lesser curvature of stomach, esophagogastroduodenoscopy and extensive lysis of adhesions (-22 modified)  SURGEON: Dr. Emmanuel Bianchi. Josue Yarbrough MD, FACS   ASSISTANT: Toby Duvall SA  ANESTHESIA: General endotracheal anesthesia. Local anesthetic mixture 1%   lidocaine, 0.5% Marcaine, with epinephrine injected locally utilizing 50  mL. FINDINGS: 1. No evidence of remaining fundoplication on the EGD  2. Extensive perigastric and epigastric adhesion requiring 60 minutes of adhesiolysis, extending the operative time by 65% over normal operative time. SPECIMEN: None  IMPLANTS: * No implants in log *  ESTIMATED BLOOD LOSS: 100 ml  FLUIDS: 2400 ml   URINE OUTPUT: 75 ml   DRAIN: 19 Lithuanian Garrett drain   COMPLICATIONS: None  OPERATIVE START TIME: 5289  OPERATIVE COMPLETION TIME: 1119    DESCRIPTION OF PROCEDURE: The patient was prepped and draped in standard sterile fashion after being placed under general anesthetic. A site was selected superior to the umbilicus in the midline. This area was incised. A [place] 5 mm Optical trocar was placed over the laparoscope and directed into the abdominal cavity using Optiview technique. Abdomen was insufflated to 15 mmHg and surveyed. There was no evidence of injury from the entry. Additional trocars were then placed.  My assistant place one 5 mm trocar was placed in the anterior axillary line left upper quadrant approximately 3 fingerbreadths below the costal margin and another 12 mm trocar was placed in the lateral portion of the left rectus sheath approximately 3 fingerbreadths lateral to the umbilical trocar. I then placed another 12 mm trocar was placed approximately 4 fingerbreadths lateral to the umbilical trocar in the  lateral portion of right rectus sheath. All were placed after incising with scalpel, all were placed using blunt dissecting technique. There was no evidence of injury from the entries. There were extensive adhesions of the lesser omentum to the left anterior abdominal wall  And epigastrium including fully adhering the liver to the diaphragm, abdominal wall and the anterior wall of the stomach. Instruments were placed in the abdominal cavity. Attention was directed to the adhesions first. Extensive adhesiolysis was undertaken using Harmonic and sharp dissection to free these adhesions from the abdominal wall and the anterior wall of the stomach from the from the liver. The liver was very tightly adherent to the cardia of the stomach. Although I had taken down her fundoplication for the lap band removal in 2017, I could not be sure that it was fully taken down based on these adhesions. The greater curvature of the stomach was then grasped and the gastrocolic ligament was retracted by my assistant and I then  dissected the ligament directly off of the wall of the stomach using the Harmonic scalpel to widely open the lesser sac. Adhesions between the posterior wall of the stomach and the retroperitoneum were taken down under direct vision to fully free the lesser sac. I was able to develop a window immediately to the left of these dense adhesions and a plan was made to use this window for the development of the pouch if there was no remaining fundoplication. Therefore, I brought an endoscope to the room and passed it transorally via the esophagus into the stomach. The stomach had no evidence of remaining fundoplication.  I was able to see the course of a laparoscopic instrument's shaft along the posterior wall of the stomach and noted that the window was lying close to the angle of His. With this finding, I planned no further cardia dissection, desufflated the stomach and removed the endoscope. The lesser curvature of the stomach was then examined. A site was selected just proximal to the crow's foot of the vagus nerve in this area. The gastrohepatic ligament was dissected away from the lesser curvature of the stomach directly on the wall of the stomach to enter the lesser sac. This fenestration was then widened using Bovie cautery over a 10 mm articulating esophageal retractor which had been placed by my assistant through the lesser sac and brought through the fenestration. Then, a 60 mm [place] stapler was placed transversely across the stomach through this fenestration, clamped and then fired to begin the formation of the gastric pouch. Another stapler was placed near the crotch of the previous staple line and directed superiorly toward the angle of His, and clamped. Prior to firing, a 34-Albanian orogastric Kaitlynn tube was brought through the esophagus into the stomach so its tip was against the transverse staple line, its course lying along the lesser curvature of stomach. The stapler was snugged against it and then fired. Then, a 60 mm [place] stapler with Seam Guard reinforcement was placed in the crotch of the previous staple line and clamped and then fired from the crotch of previous staple line directed superiorly toward the angle of His until the gastric pouch was fully divided from the distal stomach remnant. Once fully divided, the staple lines were examined, noted to be hemostatic and viable. The lesser omentum was placed between the staple lines to prevent gastro-gastric fistulization. The tip of the gastric pouch placed in the lesser sac and the distal stomach remnant was retracted anteriorly to allow exposure of the lesser sac.      The lesser curvature of the stomach was then examined. A site was selected just proximal to the crow's foot of the vagus nerve in this area. The gastrohepatic ligament was dissected away from the lesser curvature of the stomach directly on the wall of the stomach to enter the lesser sac. There were extensive fibrotic changes in this dissection. This fenestration was then widened using Bovie cautery over a 10 mm articulating esophageal retractor which had been placed by my assistant through the lesser sac and brought through the fenestration. Then, a 60 mm [place] stapler was placed transversely across the stomach through this fenestration, clamped and then fired to begin the formation of the gastric pouch. Another stapler was placed near the crotch of the previous staple line and directed superiorly toward window previously developed at the angle of His, and clamped. Prior to firing, a 34-Citizen of the [place] orogastric Kaitlynn tube was brought through the esophagus into the stomach so its tip was against the transverse staple line, its course lying along the lesser curvature of stomach. The stapler was then fired. Then, a 60 mm [place] stapler with Seam Guard reinforcement was placed in the crotch of the previous staple line and clamped and then fired from the crotch of previous staple line directed superiorly toward the angle of His until the gastric pouch was fully divided from the distal stomach remnant. Once fully divided, the staple lines were examined, noted to be hemostatic and viable. The lesser omentum was placed between the staple lines to prevent gastro-gastric fistulization. The tip of the gastric pouch placed in the lesser sac and the distal stomach remnant was retracted anteriorly to allow exposure of the lesser sac. The omentum and transverse colon were retracted superiorly, exposing ligament of Treitz.  Small bowel was measured 40 cm distal to the ligament of Treitz, divided at this level with A 60 mm [place] stapler. Harmonic scalpel was used to divide the bowel and mesentery. Harmonic dissection was used to continue the division to the base of the mesentery, confirming preservation of blood flow to the small bowel above and below the staple line prior to firing. Then, from the distal staple line, the Amarilis limb was measured 100 cm distal and an antimesenteric enterotomy was made. Another antimesenteric enterotomy was made just proximal to the proximal staple line of the biliopancreatic limb. Through these enterotomies, a 60 mm [place] stapler was placed into the bowel, clamped on the antimesenteric borders and fired to form a stapled side-to-side anastomosis. The remaining enterotomy was closed in a running inverting fashion with 3-0 Vicryl suture. The mesenteric defect was then closed with running 2-0 silk suture, extending on the bowel wall in a running Lembert fashion for second layer closure of the enterotomy. At this point, attention was directed to the transverse mesocolon. While my assistant exposed the bare area of the transverse mesentery above the ligament of Trietz. A site was selected just anterior to the ligament of Treitz. This area was incised, entering the lesser sac. The Amarilis limb was then passed through the fenestration of the transverse mesocolon and into the lesser sac taking care not to twist on its mesentery. At this point, the omentum and transverse colon were retracted inferiorly. The Amarilis limb was examined. The proximal 6 cm were noted to be tethered by its mesentery. This proximal segment was elevated by my assistant and then I excised from the mesentery using a Harmonic scalpel and then one additional staple load was used to divide the devascularized segment from the remainder of the Amarilis limb. This segment was brought out of the abdominal cavity via one of the trocar sites, passed off the field and discarded.      At this point, the amarilis limb was brought antegastric because the liver adhesions tethered the gastric pouch anteriorly. The gastrojejunostomy was begun by sewing the right antimesenteric border of the Lilian limb to the distal portion of the gastric pouch using running suture of 3-0 Vicryl. An anterior gastrotomy and antimesenteric enterotomy were made. From the left apex of these 2 enterotomies, a 3-0 Vicryl suture was placed and run on the posterior surface in a running inverting fashion to the right apex, transitioned on the anterior surface and sewn approximately half way across the opening in an inverting fashion. Then, another 3-0 Vicryl suture was placed at the left apex and sewn to the previous suture in a running inverting fashion on the anterior surface. Prior to tying these sutures, the Kaitlynn tube was brought across the anastomosis, then the sutures were tied, completing the inner layer. Then, from the left apex another 3-0 Vicryl suture was placed and run to the right apex in a running Lembert fashion completing the anterior outer layer os the anastomosis, thereby completing the anastomosis. Once this was complete, the Kaitlynn tube was removed from the patient. Then, working from within the lesser sac, the Lilian limb was sewn to the right anterior surface pf the transverse mesocolic defect with  3 interrupted sutures of 2-0 silk. Then, the left side of the lilian limb mesentery was closed to the left side of the tranverse mesocolic defect with a running suture of 2-0 silk while my assistant exposed the defects from within the lesser sac. The omentum and transverse colon were retracted superiorly. The base of the left side of the transverse mesocolic defect was exposed by my assistant and this was sewn to the base of the left side of the Lilian limb mesentery with a pursestring suture of 2-0 silk. The Lilian limb was then retracted to the left.  The right-side of the  transverse mesocolic defect was exposed by my assistant and closed to the right side of the Lilian limb mesentery with another pursestring suture of 2-0 silk, then one additional interrupted suture was placed between the right lateral surface of the Amarilis limb and the right lateral surface of the transverse mesocolic defect, completing the closure of the defect around the Amarilis limb. Once this was complete, both mesenteric defects were examined and noted to be well closed. Both anastomoses were examined and noted to be hemostatic and viable without evidence of leak. The omentum and transverse colon were retracted inferiorly back to normal position. A Garrett drain was placed via the right rectus sheath trocar site beneath the anastomosis and along the pouch staple line and along the left hemidiaphragm. It was sutured at the skin with 2-0 Nylon and connected to bulb suction at the end of the case. At this point, the abdomen was desufflated via the remaining trocars. All trocars were then removed. The trocar sites were rendered hemostatic with Bovie cautery and then closed by my assistant with interrupted 4-0 Monocryl deep dermal sutures. Dermabond was applied as wound dressings. The patient tolerated the procedure well.

## 2019-01-23 NOTE — ANESTHESIA PREPROCEDURE EVALUATION
Anesthetic History No history of anesthetic complications Review of Systems / Medical History Patient summary reviewed, nursing notes reviewed and pertinent labs reviewed Pulmonary Sleep apnea Neuro/Psych Within defined limits Cardiovascular Hypertension PAD (Splenic artery aneurysm - stable per vasc surgeon) Exercise tolerance: >4 METS 
  
GI/Hepatic/Renal 
Within defined limits Endo/Other Morbid obesity and anemia Pertinent negatives: Obesity: S/P gastric band with erosion of band. Other Findings Physical Exam 
 
Airway Mallampati: III 
TM Distance: 4 - 6 cm Cardiovascular Regular rate and rhythm,  S1 and S2 normal,  no murmur, click, rub, or gallop Dental 
 
Dentition: Poor dentition Pulmonary Breath sounds clear to auscultation Abdominal 
GI exam deferred Other Findings Anesthetic Plan ASA: 3 Anesthesia type: general 
 
 
 
 
Induction: Intravenous Anesthetic plan and risks discussed with: Patient

## 2019-01-23 NOTE — BRIEF OP NOTE
BRIEF OPERATIVE NOTE    Date of Procedure: 1/23/2019   Preoperative Diagnosis: e66.01 morbid obesity  Postoperative Diagnosis: e66.01 morbid obesity    Procedure(s):  LAPAROSCOPIC lilian-en-y GASTRIC BYPASS, AND 60 MINUTES LYSIS OF ADHESIONS, and EGD  Surgeon(s) and Role:     * Dianna Matos MD - Primary         Surgical Assistant: Clint Guerra, [address] [facility]    Surgical Staff:  Circ-1: Benjamín Chiang  Endoscopy Cami Sam, [address] [facility]  Physician Assistant: Kyle Rondon PA-C  Scrub Tech-1: Antony Staley  Scrub Tech-Relief: Meka Ponce  Surg Asst-1: Evonne EDWARDS  Surg Asst-2: Loren ALVARADO  Event Time In Time Out   Incision Start 0943    Incision Close 1119      Anesthesia: General   Estimated Blood Loss: 100  FLuid - 2400 ml  UOP - 75 ml  Drain - 19 Indonesian Garrett  Specimens: * No specimens in log *   Findings: Extensive adhesions - 60 minute lysis, no fundoplication on EGD - planned pouch appropriately sized   Complications: None  Implants: * No implants in log *

## 2019-01-23 NOTE — PROGRESS NOTES
-Pt. Noted to have a home cpap unit at bedside. However, pt. Stated she does not plan to use it, tonight. Will have night shift RT follow up with patient. -[address]

## 2019-01-23 NOTE — PERIOP NOTES
Mother Elsie Lord is designated point of contact for medical info.  Inst. To bring cpap upon return to hospital.- [phone]

## 2019-01-23 NOTE — ANESTHESIA POSTPROCEDURE EVALUATION
Procedure(s): LAPAROSCOPIC lilian-en-y GASTRIC BYPASS, AND 60 MINUTES LYSIS OF ADHESIONS. Anesthesia Post Evaluation Multimodal analgesia: multimodal analgesia used between 6 hours prior to anesthesia start to PACU discharge Patient location during evaluation: bedside Patient participation: complete - patient participated Level of consciousness: awake Pain management: adequate Airway patency: patent Anesthetic complications: no 
Cardiovascular status: stable Respiratory status: acceptable Hydration status: acceptable Post anesthesia nausea and vomiting:  controlled Visit Vitals /83 Pulse 94 Temp 36.9 °C (98.4 °F) Resp 12 SpO2 95%

## 2019-01-23 NOTE — PROGRESS NOTES
Patient awake sitting up in bed family at bedside. Reviewed goals for tonight. Pain is controlled and no other complaints.

## 2019-01-24 ENCOUNTER — APPOINTMENT (OUTPATIENT)
Dept: GENERAL RADIOLOGY | Age: 59
DRG: 621 | End: 2019-01-24
Attending: SURGERY
Payer: COMMERCIAL

## 2019-01-24 LAB
ANION GAP SERPL CALC-SCNC: 11 MMOL/L (ref 3–18)
BUN SERPL-MCNC: 9 MG/DL (ref 7–18)
BUN/CREAT SERPL: 14 (ref 12–20)
CALCIUM SERPL-MCNC: 8.5 MG/DL (ref 8.5–10.1)
CHLORIDE SERPL-SCNC: 105 MMOL/L (ref 100–108)
CO2 SERPL-SCNC: 25 MMOL/L (ref 21–32)
CREAT SERPL-MCNC: 0.63 MG/DL (ref 0.6–1.3)
ERYTHROCYTE [DISTWIDTH] IN BLOOD BY AUTOMATED COUNT: 14.1 % (ref 11.6–14.5)
GLUCOSE SERPL-MCNC: 88 MG/DL (ref 74–99)
HCT VFR BLD AUTO: 33.8 % (ref 35–45)
HGB BLD-MCNC: 11.2 G/DL (ref 12–16)
MCH RBC QN AUTO: 28.4 PG (ref 24–34)
MCHC RBC AUTO-ENTMCNC: 33.1 G/DL (ref 31–37)
MCV RBC AUTO: 85.6 FL (ref 74–97)
PLATELET # BLD AUTO: 334 K/UL (ref 135–420)
PMV BLD AUTO: 10.4 FL (ref 9.2–11.8)
POTASSIUM SERPL-SCNC: 4 MMOL/L (ref 3.5–5.5)
RBC # BLD AUTO: 3.95 M/UL (ref 4.2–5.3)
SODIUM SERPL-SCNC: 141 MMOL/L (ref 136–145)
WBC # BLD AUTO: 8.7 K/UL (ref 4.6–13.2)

## 2019-01-24 PROCEDURE — 77010033678 HC OXYGEN DAILY

## 2019-01-24 PROCEDURE — 74011250637 HC RX REV CODE- 250/637: Performed by: SURGERY

## 2019-01-24 PROCEDURE — 85027 COMPLETE CBC AUTOMATED: CPT

## 2019-01-24 PROCEDURE — 74240 X-RAY XM UPR GI TRC 1CNTRST: CPT

## 2019-01-24 PROCEDURE — 74011250636 HC RX REV CODE- 250/636: Performed by: SURGERY

## 2019-01-24 PROCEDURE — 80048 BASIC METABOLIC PNL TOTAL CA: CPT

## 2019-01-24 PROCEDURE — 74011636320 HC RX REV CODE- 636/320: Performed by: SURGERY

## 2019-01-24 PROCEDURE — C9113 INJ PANTOPRAZOLE SODIUM, VIA: HCPCS | Performed by: SURGERY

## 2019-01-24 PROCEDURE — 65270000029 HC RM PRIVATE

## 2019-01-24 PROCEDURE — 36415 COLL VENOUS BLD VENIPUNCTURE: CPT

## 2019-01-24 PROCEDURE — 74011000250 HC RX REV CODE- 250: Performed by: SURGERY

## 2019-01-24 PROCEDURE — 74011250636 HC RX REV CODE- 250/636: Performed by: PHYSICIAN ASSISTANT

## 2019-01-24 RX ORDER — DEXAMETHASONE SODIUM PHOSPHATE 4 MG/ML
8 INJECTION, SOLUTION INTRA-ARTICULAR; INTRALESIONAL; INTRAMUSCULAR; INTRAVENOUS; SOFT TISSUE ONCE
Status: COMPLETED | OUTPATIENT
Start: 2019-01-24 | End: 2019-01-24

## 2019-01-24 RX ORDER — ACETAMINOPHEN 325 MG/1
650 TABLET ORAL
Status: DISCONTINUED | OUTPATIENT
Start: 2019-01-24 | End: 2019-01-24

## 2019-01-24 RX ORDER — SODIUM CHLORIDE 0.9 % (FLUSH) 0.9 %
5-40 SYRINGE (ML) INJECTION AS NEEDED
Status: DISCONTINUED | OUTPATIENT
Start: 2019-01-24 | End: 2019-01-25 | Stop reason: HOSPADM

## 2019-01-24 RX ORDER — ENOXAPARIN SODIUM 100 MG/ML
40 INJECTION SUBCUTANEOUS DAILY
Qty: 7 SYRINGE | Refills: 0 | Status: SHIPPED
Start: 2019-01-24 | End: 2019-02-04

## 2019-01-24 RX ORDER — OXYCODONE HYDROCHLORIDE 5 MG/1
5 TABLET ORAL
Qty: 15 TAB | Refills: 0 | Status: SHIPPED | OUTPATIENT
Start: 2019-01-24 | End: 2019-01-30

## 2019-01-24 RX ORDER — OXYCODONE HYDROCHLORIDE 5 MG/1
5 TABLET ORAL
Status: DISCONTINUED | OUTPATIENT
Start: 2019-01-24 | End: 2019-01-25 | Stop reason: HOSPADM

## 2019-01-24 RX ORDER — ACETAMINOPHEN 650 MG/1
650 SUPPOSITORY RECTAL
Status: DISCONTINUED | OUTPATIENT
Start: 2019-01-24 | End: 2019-01-25 | Stop reason: HOSPADM

## 2019-01-24 RX ORDER — SODIUM CHLORIDE 0.9 % (FLUSH) 0.9 %
5-40 SYRINGE (ML) INJECTION EVERY 8 HOURS
Status: DISCONTINUED | OUTPATIENT
Start: 2019-01-24 | End: 2019-01-25 | Stop reason: HOSPADM

## 2019-01-24 RX ORDER — ONDANSETRON 4 MG/1
4 TABLET, ORALLY DISINTEGRATING ORAL
Qty: 10 TAB | Refills: 0 | Status: SHIPPED | OUTPATIENT
Start: 2019-01-24 | End: 2019-01-30

## 2019-01-24 RX ORDER — HYDROMORPHONE HYDROCHLORIDE 1 MG/ML
1 INJECTION, SOLUTION INTRAMUSCULAR; INTRAVENOUS; SUBCUTANEOUS
Status: DISCONTINUED | OUTPATIENT
Start: 2019-01-24 | End: 2019-01-25 | Stop reason: HOSPADM

## 2019-01-24 RX ADMIN — ENOXAPARIN SODIUM 40 MG: 40 INJECTION, SOLUTION INTRAVENOUS; SUBCUTANEOUS at 09:19

## 2019-01-24 RX ADMIN — SODIUM CHLORIDE, SODIUM LACTATE, POTASSIUM CHLORIDE, AND CALCIUM CHLORIDE 150 ML/HR: 600; 310; 30; 20 INJECTION, SOLUTION INTRAVENOUS at 17:07

## 2019-01-24 RX ADMIN — SODIUM CHLORIDE, SODIUM LACTATE, POTASSIUM CHLORIDE, AND CALCIUM CHLORIDE 150 ML/HR: 600; 310; 30; 20 INJECTION, SOLUTION INTRAVENOUS at 23:14

## 2019-01-24 RX ADMIN — IOHEXOL 50 ML: 240 INJECTION, SOLUTION INTRATHECAL; INTRAVASCULAR; INTRAVENOUS; ORAL at 07:56

## 2019-01-24 RX ADMIN — SODIUM CHLORIDE 40 MG: 9 INJECTION, SOLUTION INTRAMUSCULAR; INTRAVENOUS; SUBCUTANEOUS at 09:20

## 2019-01-24 RX ADMIN — KETOROLAC TROMETHAMINE 15 MG: 30 INJECTION, SOLUTION INTRAMUSCULAR at 03:11

## 2019-01-24 RX ADMIN — ACETAMINOPHEN 650 MG: 650 SUPPOSITORY RECTAL at 17:07

## 2019-01-24 RX ADMIN — Medication 10 ML: at 21:26

## 2019-01-24 RX ADMIN — DIPHENHYDRAMINE HYDROCHLORIDE 25 MG: 50 INJECTION, SOLUTION INTRAMUSCULAR; INTRAVENOUS at 21:50

## 2019-01-24 RX ADMIN — ACETAMINOPHEN 650 MG: 650 SUPPOSITORY RECTAL at 21:40

## 2019-01-24 RX ADMIN — DEXAMETHASONE SODIUM PHOSPHATE 8 MG: 4 INJECTION, SOLUTION INTRAMUSCULAR; INTRAVENOUS at 09:20

## 2019-01-24 RX ADMIN — HYDROMORPHONE HYDROCHLORIDE 1 MG: 1 INJECTION, SOLUTION INTRAMUSCULAR; INTRAVENOUS; SUBCUTANEOUS at 03:19

## 2019-01-24 RX ADMIN — HYOSCYAMINE SULFATE 0.12 MG: 0.12 TABLET, ORALLY DISINTEGRATING ORAL at 21:26

## 2019-01-24 RX ADMIN — SODIUM CHLORIDE, SODIUM LACTATE, POTASSIUM CHLORIDE, AND CALCIUM CHLORIDE 150 ML/HR: 600; 310; 30; 20 INJECTION, SOLUTION INTRAVENOUS at 01:35

## 2019-01-24 NOTE — PROGRESS NOTES
Reason for Admission:   Gastric bypass                   RRAT Score:   5                  Plan for utilizing home health: no                         Likelihood of Readmission:  low                         Transition of Care Plan:    Spoke with pt, lives with her mother. Independent with adls and amb. Has cpap, no other dme. Mother to transport home. Demographics correct. pcp dr Daniela Caceres. Plan home. Patient has designated ____mother____________________ to participate in his/her discharge plan and to receive any needed information. Name: Alexx Byrnes  Address:  Phone number: [phone]    Care Management Interventions  PCP Verified by CM: Yes  Palliative Care Criteria Met (RRAT>21 & CHF Dx)?: No  Mode of Transport at Discharge:  Other (see comment)  Transition of Care Consult (CM Consult): Discharge Planning  Discharge Durable Medical Equipment: No  Physical Therapy Consult: No  Occupational Therapy Consult: No  Speech Therapy Consult: No  Current Support Network: Relative's Home  Confirm Follow Up Transport: Family  Plan discussed with Pt/Family/Caregiver: Yes  Discharge Location  Discharge Placement: Home

## 2019-01-24 NOTE — ROUTINE PROCESS
Bedside and Verbal shift change report given to Rossana Marroquin RN   (oncoming nurse) by Tahmina Echeverria RN   (offgoing nurse). Report included the following information SBAR, Kardex and MAR.

## 2019-01-24 NOTE — PROGRESS NOTES
0720 - Bedside and Verbal shift change report given to Genevieve Peña RN (oncoming nurse) by Tiffany Milian RN (offgoing nurse). Report included the following information SBAR, Kardex, Intake/Output and MAR. Pt off floor to radiology. 0915 - pt back on floor    0919 - AM meds administered & shift assessment performed. Pt ambulating in room. 1100 - pt ambulating in hallway. 1316 - pt ambulating in hallway. No gait disturbance noted, will cont to monitor. 1707 - pt c/o \"mild\" pain. Prn tylenol administered. Pt attempted to administer med but was unable to do so. This RN successful w/med administration. Pt sitting up in bed. Will cont to monitor. 1847 - pt ambulating in hallway. Will cont to monitor. Bedside and Verbal shift change report given to Joyce RN (oncoming nurse) by Genevieve Peña RN (offgoing nurse). Report included the following information SBAR, Kardex, Intake/Output and MAR.

## 2019-01-24 NOTE — PROGRESS NOTES
0005 NPO after MN. For gastrograffin this morning. Refuse pain med. 0030 Sleeping at present. Zofran seem to help relieve nausea. 0240 Up with assist to bathroom. Patient states she is nauseated. 8717 Patient assisted to bathroom. Complain of nausea & abdominal pain. Dilaudid given for abdominal pain. Patient spit up about 10 ml of brown emesis. 6880 Patient stated that she feels a lot better & more relax after Dilaudid given. Nausea gone at present. 0508 Ambulated in hallway with assist. Tolerated it well. No complain of nausea or vomiting.

## 2019-01-24 NOTE — PROGRESS NOTES
2000 Patient received in bed. Assisted to bathroom & ambulated in hallway. Patient got nauseated. Patient assisted back to bed. Head of bed elevated 30 degrees. Refuse Tylenol at present due to nausea. Patient alert & oriented x4. Call light in reach & side rails up x3. Patient encourage to use ICS, could get it up to 750. JOHN drain intact draining sanguinous drainage. 2100 In bed resting without any complaints voiced. 2200 Patient assisted to bathroom & ambulated in hallway. Patient got nauseated. 2354 Patient assisted to bathroom. Patient vomited small amount of brown emesis (about 10-15 ml). Zofran given for complain of nausea.

## 2019-01-24 NOTE — PROGRESS NOTES
Pt Upper GI Gastrograffin complete. Pt sent back to room and must remain upright per doctors orders.  SLG

## 2019-01-24 NOTE — PROGRESS NOTES
The patient's upper GI is reviewed in radiology with Dr. Cheyenne Conklin. As expected, contrast is slow to flow through the gastrojejunostomy. It does, however, flow through. As a result we will progress the patient slowly. She will be given only sips of liquid as tolerated today. This slow transit is thought to be related to swelling at the anastomosis and as such, we will administer a dose of Decadron as well. Patient will be kept overnight and we will attempt to advance her diet to meet her intake goals by tomorrow afternoon.     Kelly Mendoza MS, PA-C

## 2019-01-24 NOTE — PROGRESS NOTES
Surgery Progress Note    1/24/2019    Admit Date: 1/23/2019    Subjective:     Patient has complaints of none. Two episodes of emesis overnight with some dried blood. Pt refused pain meds. C/o'd persistent nausea overnight. Accepted one dose of dilaudid and symptoms resolved completely. This am without complaint. Pain is controlled with current regimen. Patient has been ambulating in halls. She reports no nausea and no vomiting and is tolerating ice chips well. Objective:     Blood pressure 147/85, pulse 95, temperature 98.5 °F (36.9 °C), resp. rate 18, SpO2 100 %. No intake/output data recorded. 01/22 1901 - 01/24 0700  In: 3405 [I.V.:3405]  Out: 2368 [Urine:2100; Drains:218]    EXAM: GENERAL: alert, pleasant, no distress   HEART: regular rate and rhythm   LUNGS: clear to auscultation   ABDOMEN:  Soft, obese, appropriately tender, nondistended, incisions clean, dry, no erythema or drainage. [facility] RAE in place with serosang d/c (about 200 cc overnight).     EXTREMITIES: warm, well perfused    Data Review    Recent Results (from the past 24 hour(s))   CBC W/O DIFF    Collection Time: 01/24/19  3:35 AM   Result Value Ref Range    WBC 8.7 4.6 - 13.2 K/uL    RBC 3.95 (L) 4.20 - 5.30 M/uL    HGB 11.2 (L) 12.0 - 16.0 g/dL    HCT 33.8 (L) 35.0 - 45.0 %    MCV 85.6 74.0 - 97.0 FL    MCH 28.4 24.0 - 34.0 PG    MCHC 33.1 31.0 - 37.0 g/dL    RDW 14.1 11.6 - 14.5 %    PLATELET 322 780 - 107 K/uL    MPV 10.4 9.2 - 77.8 FL   METABOLIC PANEL, BASIC    Collection Time: 01/24/19  3:35 AM   Result Value Ref Range    Sodium 141 136 - 145 mmol/L    Potassium 4.0 3.5 - 5.5 mmol/L    Chloride 105 100 - 108 mmol/L    CO2 25 21 - 32 mmol/L    Anion gap 11 3.0 - 18 mmol/L    Glucose 88 74 - 99 mg/dL    BUN 9 7.0 - 18 MG/DL    Creatinine 0.63 0.6 - 1.3 MG/DL    BUN/Creatinine ratio 14 12 - 20      GFR est AA >60 >60 ml/min/1.73m2    GFR est non-AA >60 >60 ml/min/1.73m2    Calcium 8.5 8.5 - 10.1 MG/DL       Assessment:   Brock Ramey Stuart Katz is a 64 y.o. female, postop day 1 status post laparoscopic gastric bypass surgerywith extensive lysis of adhesions and dipak drain placement.   Condition: good    Plan:   -Check ugi if ok proceed with plan as below:  - Drain teaching.  - Ambulate every four hours  -Oxycodone 5mg 1-2 tabs po every 4-6 hour prn pain uncontrolled by tylenol  -Advance to Clear liquid Gastric Bypass Diet, if able to tolerate clear liquid diet 4oz per hour one of which being a protein supplement, will discharge home later today      Nickolas Bailey MS,  PA-C

## 2019-01-24 NOTE — CDMP QUERY
The medical record reflects the following: 
 
Risk:59 yo female postop day 1 status post laparoscopic gastric bypass surgery with extensive lysis of adhesions and dipak drain placement Clinical Indicators: 
- 1/24  per Progress note Two episodes of emesis overnight with some dried blood. Pt refused pain meds. C/o'd persistent nausea overnight 
 
-UGI  There is delayed passage of contrast from the distal esophagus into the gastric pouch. Over time, there is slow emptying of the stomach into the pouch which demonstrates an obliquely oriented triangular morphology Treatment: UGI, decadron. zofran, analgesics, change diet orders Please clarify if this patient is being treated/managed for: =>Swelling of anastastomosis causing partial obstruction, post op complication =>Swelling of anastastomosis causing partial obstruction, not a post op complication =>Swelling of anastastomosis, no obstruction, post op complication =>Swelling of anastastomosis, no obstruction, not a post op complication =>Other Explanation of clinical findings =>Unable to Determine (no explanation of clinical findings) Please clarify and document your clinical opinion in the progress notes and discharge summary including the definitive and/or presumptive diagnosis, (suspected or probable), related to the above clinical findings. Please include clinical findings supporting your diagnosis. If you DECLINE this query or would like to communicate with [facility], please utilize the \"fundfindr message box\" at the TOP of the Progress Note on the right. Thank you, 
Michaele Favre RN [facility]   [phone]

## 2019-01-25 VITALS
RESPIRATION RATE: 18 BRPM | TEMPERATURE: 98.4 F | HEART RATE: 81 BPM | DIASTOLIC BLOOD PRESSURE: 83 MMHG | OXYGEN SATURATION: 97 % | SYSTOLIC BLOOD PRESSURE: 147 MMHG

## 2019-01-25 PROCEDURE — C9113 INJ PANTOPRAZOLE SODIUM, VIA: HCPCS | Performed by: SURGERY

## 2019-01-25 PROCEDURE — 74011250636 HC RX REV CODE- 250/636: Performed by: SURGERY

## 2019-01-25 PROCEDURE — 74011250637 HC RX REV CODE- 250/637: Performed by: SURGERY

## 2019-01-25 RX ORDER — SODIUM CHLORIDE 0.9 % (FLUSH) 0.9 %
5-40 SYRINGE (ML) INJECTION EVERY 8 HOURS
Status: DISCONTINUED | OUTPATIENT
Start: 2019-01-25 | End: 2019-01-25 | Stop reason: HOSPADM

## 2019-01-25 RX ORDER — SODIUM CHLORIDE 0.9 % (FLUSH) 0.9 %
5-40 SYRINGE (ML) INJECTION AS NEEDED
Status: DISCONTINUED | OUTPATIENT
Start: 2019-01-25 | End: 2019-01-25 | Stop reason: HOSPADM

## 2019-01-25 RX ORDER — DEXAMETHASONE SODIUM PHOSPHATE 4 MG/ML
4 INJECTION, SOLUTION INTRA-ARTICULAR; INTRALESIONAL; INTRAMUSCULAR; INTRAVENOUS; SOFT TISSUE ONCE
Status: COMPLETED | OUTPATIENT
Start: 2019-01-25 | End: 2019-01-25

## 2019-01-25 RX ADMIN — HYOSCYAMINE SULFATE 0.12 MG: 0.12 TABLET, ORALLY DISINTEGRATING ORAL at 04:11

## 2019-01-25 RX ADMIN — ENOXAPARIN SODIUM 40 MG: 40 INJECTION, SOLUTION INTRAVENOUS; SUBCUTANEOUS at 09:25

## 2019-01-25 RX ADMIN — ACETAMINOPHEN 650 MG: 650 SUPPOSITORY RECTAL at 04:20

## 2019-01-25 RX ADMIN — SODIUM CHLORIDE 40 MG: 9 INJECTION, SOLUTION INTRAMUSCULAR; INTRAVENOUS; SUBCUTANEOUS at 09:26

## 2019-01-25 RX ADMIN — DIPHENHYDRAMINE HYDROCHLORIDE 25 MG: 50 INJECTION, SOLUTION INTRAMUSCULAR; INTRAVENOUS at 04:20

## 2019-01-25 RX ADMIN — SODIUM CHLORIDE, SODIUM LACTATE, POTASSIUM CHLORIDE, AND CALCIUM CHLORIDE 150 ML/HR: 600; 310; 30; 20 INJECTION, SOLUTION INTRAVENOUS at 04:24

## 2019-01-25 RX ADMIN — SODIUM CHLORIDE, SODIUM LACTATE, POTASSIUM CHLORIDE, AND CALCIUM CHLORIDE 150 ML/HR: 600; 310; 30; 20 INJECTION, SOLUTION INTRAVENOUS at 10:48

## 2019-01-25 RX ADMIN — Medication 10 ML: at 09:33

## 2019-01-25 RX ADMIN — DEXAMETHASONE SODIUM PHOSPHATE 4 MG: 4 INJECTION, SOLUTION INTRAMUSCULAR; INTRAVENOUS at 09:26

## 2019-01-25 NOTE — PROGRESS NOTES
Problem: Falls - Risk of  Goal: *Absence of Falls  Document Osman Fall Risk and appropriate interventions in the flowsheet.   Outcome: Progressing Towards Goal  Fall Risk Interventions:  Mobility Interventions: Patient to call before getting OOB         Medication Interventions: Evaluate medications/consider consulting pharmacy, Patient to call before getting OOB, Teach patient to arise slowly    Elimination Interventions: Call light in reach, Patient to call for help with toileting needs, Toileting schedule/hourly rounds

## 2019-01-25 NOTE — PROGRESS NOTES
Post op diet progression discussed with patient. Patient to be discharged on a bariatric clear liquid diet. Patient verbalizes understanding of bariatric clear liquid and bariatric soft and moist diet. All of the patients questions and concerns have been addressed prior to discharge. Patient to follow up with surgeon in 7-14 days. Lovenox self injection instructions given. Drain Care education completed by Nurse      General Care after Surgery    1. No lifting over 15 lbs for 4 weeks. 2. No driving while taking the pain medication (approximately 7-10 days). 3. No tub baths, swimming or hot tubs until incisions are healed. (about 2 weeks)    4. You may shower. Clean incisions daily /gently with soap and check incisions for signs of infection:   Redness around incision   Swelling at site   Drainage with an foul odor (pus)   Increase tenderness around incision    5. Take your temperature and resting pulse in the morning and evening. Record on tracking form given to you. Call if your temperature is greater than 101 or your pulse rate is greater than 115.    6. Please contact your surgeon if you are having excessive abdominal pain (that lasts longer than 4 hours and does not improve with prescribed pain medication), vomiting or shortness of breath. 7. Get up and move - do not sit in one place for more than an hour. 8. You need to WALK (EXERCISE) for 30 minutes per day. (Walking around your house does not count)      a. Bike, treadmill and elliptical are OK  b. NO weight lifting or sit ups    9. If constipated take an adult dose of Miralax (available over the counter). Contact the doctors office if Karena lax doesn'tT help.     10.  You may swallow pills starting the day after surgery as long as they fit inside this [place]:                                            11.  Continue to use your incentive spirometer (breathing machine) for the next couple of weeks to help prevent pneumonia. Phone numbers: [facility] at [address] [facility] at [address] phone #: [phone]  Yanet Crespo phone# [phone]   Cassandra Whaley phone#: [phone]  Cassidys phone#: [phone]    Key Diet Principles Following Bariatric Surgery     1. Begin each meal with soft moist high protein foods (i.e. chicken, turkey, yogurt, tuna, eggs, cottage cheese, other fish and seafood). 2.  Consume a minimum of 64 oz. of fluid each day to prevent dehydration. No straws. 3.  No food and fluid together. Stop drinking 30 minutes before a meal.  You may begin fluids again 30 minutes after you finish a meal.    4.  Eat very slowly and chew all foods completely. 6.  Keep portions small. 7.  No simple sugars or high fat foods. No carbonated beverages. No Caffeine. 8.  Eat 3 meals per day. No skipping. Avoid snacking between meals. 9.  No alcohol. No Smoking. 10. Two [place] Complete Chewable vitamins each day. Take one in the morning and one at night. 11. 1500 mg Calcium Citrate per day in separate dosages      12. Vitamin D 3: 5000 IU taken per day. 13. Vitamin B-12:  Take 1000 mcg sublingually daily    14. Iron: 60 mg per day for women menstruating    15. Protein supplements of your choice. Must be low sugar (0-3 gm), low fat    (0-3 gm) and provide at least 35-40 gm of protein each day. You need a total  (food + supplements) of 60 - 70 grams of protein each day   16. Minimum of 30 minutes of physical activity daily. 16. Do not take NSAID or Steroids without your surgeons permission. [facility] Gastric Bypass and Sleeve Dietary Progression    Patient Name: SB      Date of Surgery:  1/23    Ice Chips start once admitted on floor.  1/23     Begin Bariatric Clear Liquid Diet on: 1/24    Clear Liquid Diet: 64 oz. of fluid per day  o Low calorie, low sugar, non-carbonated beverages  - Water, Crystal Light, Propel Water, Sugar Free Jell-O, Sugar Free Popsicles, Bouillon  - Start protein supplement during this stage. (60-70 grams per day)  - Getting your fluid in and staying hydrated is your #1 priority! - The clear liquid diet will last for 7 days. Begin Bariatric Soft and Moist on: 2/1  - This stage of the diet will last for 5 weeks, unless otherwise instructed by your surgeon. - Begin:  1 week post-op   - End:  6 weeks post-op (or when you follow up with the Registered Dietitian)    - Soft, moist, high protein foods: 3 meals per day plus protein supplements. o   Portions should emphasize on soft protein. o Portions will be a MAXIMUM of:  o  1 ounce of solid food  o  2-3 ounces of cottage cheese and yogurt. o Protein supplements should be between meals and provide 30-40 grams per day during soft protein diet. o Continue to get 64 ounces of fluid in per day. - Protein foods that are ok on the Soft and Moist Diet include:  o Slow transition:  o 1st week on soft protein should focus on: Yogurt, cottage cheese, eggs, vegetarian refried beans, black beans, kidney beans, white beans. NO BAKED BEANS  o 2nd -4th  week on soft protein diet should focus on: yogurt, cottage cheese, eggs, canned tuna, canned chicken, tilapia, fish (needs to be soft enough to be cut up with a fork)  o 5th week on soft protein diet should focus on: Yogurt, cottage cheese, eggs, canned tuna, canned chicken, tilapia, fish, salmon, chicken breast, or turkey. Remember to continue to get 64 ounces of fluid daily on ALL Stages.     To be advanced to Bariatric Maintenance Stage of the bariatric diet, follow up with the dietitian 6 weeks post-op, around:                   TEMPERATURE/HEART RATE LOG  WEEK 1  Day Date Morning temperature Morning heart rate Evening temperature Evening heart rate   1        2        3        4        5        6        7          WEEK 2  Day Date Morning temperature Morning heart rate Evening temperature Evening heart rate   1        2        3        4        5        6        7          Instructions: Take your temperature and heart rate (pulse) twice a day for 14 days. Take both in the morning and evening at about the same time each day (when you wake up and before you go to bed when you are relaxed)  Please contact your doctors office if your:  ? Temperature is higher than 101°  ? Heart rate (pulse) is higher than 115 beats per minute    (normal heart rate is  beats per minute)    [facility]  [phone]  [place]:    [phone]      HOW TO TAKE YOUR HEART RATE (PULSE)       How to do it:  1. Turn your left hand so that your palm is face-up. 2. With the index and middle fingers of your right hand, draw a line from the base of your thumb to just below the crease in your wrist. Your fingers should nestle just to the left of the large tendon that pops up when you bend your wrist toward you. 3. DonT press too hard, that will make the pulse go away. Use gentle pressure. 4. Wait. It can take several seconds--and several micro-adjustments in the placement of your two fingers on your wrist--to find your pulse. Just keep moving your fingers down or up your wrist in small increments (and pausing for a few seconds) until you find it. 5. To take your pulse rate:  1. Find a watch with a second hand and place it on your right wrist or on the table next to your left hand. 2. After finding your pulse, count the number of beats for 20 seconds. 3. Multiply by 3 to get your heart rate, or beats per minute (or just count for 60 seconds for a math-free option). 4. Normal, resting heart rate is about  beats per minute. Remember to keep all you follow up appointments and to have your labs drawn. If you experience Nausea or Vomiting ask yourself these questions. 1. Did I just eat or drink something I was not suppose too ? Did it have too much sugar or fat ? 2.  How much fluid did I drink today and yesterday? Was it 64 ounces? Am I getting dehydrated? 3. Did I take too big of a bite and did I chew all my   food properly ? If you did everything you were suppose to do and still experience nausea or vomiting contact you surgeon. Hydration Prevents Dehydration  You are required by you surgeon to drink 48-64 ounces of fluid every day to prevent dehydration. Dehydration is very serious and could require being admitted back to the hospital. You can reach your fluid goal of 48-64 ounces per day by constantly sipping small amounts of fluids. Water should be your drink of choice at all times.     Signs and Symptoms of dehydration (5% fluid loss)   Fatigue (loss of energy)   Thirst, Dry Mouth   Dry Skin, Skin Flushing   Dark color urine   Increased Heart rate & Respirations   Muscle cramps   Headache   Nausea   Tingling of  the limbs  (10% Fluid Loss   Muscle Spasms   Vomiting   Racing Pulse   Shriveled Skin   Painful Urination   Confusion   Difficulty breathing   Seizures/Unconsciousness  If you suspect you may be dehydrated call the office first.

## 2019-01-25 NOTE — DISCHARGE INSTRUCTIONS
DISCHARGE SUMMARY from Nurse    PATIENT INSTRUCTIONS:    After general anesthesia or intravenous sedation, for 24 hours or while taking prescription Narcotics:  · Limit your activities  · Do not drive and operate hazardous machinery  · Do not make important personal or business decisions  · Do  not drink alcoholic beverages  · If you have not urinated within 8 hours after discharge, please contact your surgeon on call. Report the following to your surgeon:  · Excessive pain, swelling, redness or odor of or around the surgical area  · Temperature over 100.5  · Nausea and vomiting lasting longer than 4 hours or if unable to take medications  · Any signs of decreased circulation or nerve impairment to extremity: change in color, persistent  numbness, tingling, coldness or increase pain  · Any questions    What to do at Home:  Recommended activity: Activity as tolerated, see surgeon's instructions. If you experience any of the symptoms above, please follow up with Dr. Asha Thompson. *  Please give a list of your current medications to your Primary Care Provider. *  Please update this list whenever your medications are discontinued, doses are      changed, or new medications (including over-the-counter products) are added. *  Please carry medication information at all times in case of emergency situations. These are general instructions for a healthy lifestyle:    No smoking/ No tobacco products/ Avoid exposure to second hand smoke  Surgeon General's Warning:  Quitting smoking now greatly reduces serious risk to your health.     Obesity, smoking, and sedentary lifestyle greatly increases your risk for illness    A healthy diet, regular physical exercise & weight monitoring are important for maintaining a healthy lifestyle    You may be retaining fluid if you have a history of heart failure or if you experience any of the following symptoms:  Weight gain of 3 pounds or more overnight or 5 pounds in a week, increased swelling in our hands or feet or shortness of breath while lying flat in bed. Please call your doctor as soon as you notice any of these symptoms; do not wait until your next office visit. Recognize signs and symptoms of STROKE:    F-face looks uneven    A-arms unable to move or move unevenly    S-speech slurred or non-existent    T-time-call 911 as soon as signs and symptoms begin-DO NOT go       Back to bed or wait to see if you get better-TIME IS BRAIN. Warning Signs of HEART ATTACK     Call 911 if you have these symptoms:   Chest discomfort. Most heart attacks involve discomfort in the center of the chest that lasts more than a few minutes, or that goes away and comes back. It can feel like uncomfortable pressure, squeezing, fullness, or pain.  Discomfort in other areas of the upper body. Symptoms can include pain or discomfort in one or both arms, the back, neck, jaw, or stomach.  Shortness of breath with or without chest discomfort.  Other signs may include breaking out in a cold sweat, nausea, or lightheadedness. Don't wait more than five minutes to call 911 - MINUTES MATTER! Fast action can save your life. Calling 911 is almost always the fastest way to get lifesaving treatment. Emergency Medical Services staff can begin treatment when they arrive -- up to an hour sooner than if someone gets to the hospital by car. The discharge information has been reviewed with the patient. The patient verbalized understanding. Discharge medications reviewed with the patient and appropriate educational materials and side effects teaching were provided. Patient armband removed and shredded.   ___________________________________________________________________________________________________________________________________

## 2019-01-25 NOTE — PROGRESS NOTES
Bedside shift change report given to MARKIE Cook (oncoming nurse) by Daija Murillo RN (offgoing nurse). Report included the following information SBAR, Kardex, Intake/Output and MAR. Primary Nurse Jose Parker and Daija Murillo RN performed a dual skin assessment on this patient No impairment noted w/ exception of lap sites. 2000: Pt ambulated, JOHN drain teaching provided, pt demonstrates emptying. No complaints at this time. 2291: Pt ambulating in hallway.

## 2019-01-25 NOTE — PROGRESS NOTES
Surgery Progress Note    1/25/2019    Admit Date: 1/23/2019    Subjective:     Patient has complaints of mild reflux. Pt sahara PO pills, and sips of bariatric liquids overnight. Pain is controlled with current regimen. Patient has been ambulating in halls. She reports no nausea and no vomiting and is tolerating ice chips well. Denies pain and otherwise without complaint. Objective:     Blood pressure 111/66, pulse 79, temperature 98.5 °F (36.9 °C), resp. rate 18, SpO2 97 %. No intake/output data recorded. 01/23 1901 - 01/25 0700  In: 3587.5 [P.O.:60; I.V.:3527.5]  Out: 2455 [Urine:4175; Drains:133]    EXAM: GENERAL: alert, pleasant, no distress   HEART: regular rate and rhythm   LUNGS: clear to auscultation   ABDOMEN:  Soft, obese, appropriately tender, nondistended, incisions clean, dry, no erythema or drainage. Bobbi Yang in place with minimal serosang d/c. (about 100mL overnight). EXTREMITIES: warm, well perfused    Data Review  No results found for this or any previous visit (from the past 24 hour(s)). Assessment:   Nicolás Osman is a 64 y.o. female, postop day 2 status post laparoscopic gastric bypass surgery. Condition: good; progressing slowly.     Plan:   - Frantz Martinez likely edema-related; due for PPI this am.   - Ambulate every four hours  -Oxycodone 5mg 1-2 tabs po every 4-6 hour prn pain uncontrolled by tylenol  -Advance to Clear liquid Gastric Bypass Diet, if able to tolerate clear liquid diet 4oz per hour one of which being a protein supplement, will discharge home later today      Kathryn Nettles, MS,  PA-C

## 2019-01-25 NOTE — PROGRESS NOTES
Bedside and Verbal shift change report given to Zara Brandon RN (oncoming nurse) by Mehrdad Jerome RN (offgoing nurse). Report included the following information SBAR, Kardex and MAR.     1250- Pt in bed, eating lunch, on room air. Voiced no distress and waiting to be discharge this afternoon. 1340- Confirmed with Cassidy, pt going home with JOHN drain, and it's to be removed by surgeon in the next follow up appt. 1355- MARKIE Fuchs in room with pt reviewing discharge instructions.

## 2019-01-28 ENCOUNTER — OFFICE VISIT (OUTPATIENT)
Dept: SURGERY | Age: 59
End: 2019-01-28

## 2019-01-28 VITALS
DIASTOLIC BLOOD PRESSURE: 73 MMHG | HEART RATE: 117 BPM | BODY MASS INDEX: 39.93 KG/M2 | HEIGHT: 62 IN | WEIGHT: 217 LBS | OXYGEN SATURATION: 96 % | SYSTOLIC BLOOD PRESSURE: 116 MMHG | TEMPERATURE: 95.5 F

## 2019-01-28 DIAGNOSIS — K91.2 POSTOPERATIVE MALABSORPTION: Primary | ICD-10-CM

## 2019-01-28 NOTE — PROGRESS NOTES
Luz Marina Garrett is a 64 y.o. female (: 1960) presenting to address:    Chief Complaint   Patient presents with    Surgical Follow-up     GBP    Drainage from Incision       Medication list and allergies have been reviewed with Luz Marina Garrett and updated as of today's date. I have gone over all Medical, Surgical and Social History with Luz Marina Garrett and updated/added the information accordingly. 1. Have you been to the ER, Urgent Care or Hospitalized since your last visit? YES       2. Have you followed up with your PCP or any other Physicians since your procedure/ last office visit?    NO

## 2019-01-28 NOTE — PATIENT INSTRUCTIONS
If you have any questions or concerns about today's appointment, the verbal and/or written instructions you were given for follow up care, please call our office at [phone].     [facility] - [address]    [phone] office  [phone]

## 2019-01-30 NOTE — PROGRESS NOTES
Subjective:      Mikey Davis is a 64 y.o. female is now 7 days status post laparoscopic gastric bypass surgery. Doing well overall. Currently on a stage 2 diet without difficulty. Taking in 60oz water,  60g protein. 30min of activity daily. Bowel movements are regular. The patient is not having any pain. .  The patient is compliant with multivitamins, calcium and B12 supplements. Weight Loss Metrics 1/28/2019 1/27/2019 1/14/2019 1/14/2019 12/26/2018 12/26/2018 10/8/2018   Pre op / Initial Wt - - 223.2 - 227 - 220   Today's Wt 217 lb 216 lb - 220 lb - 227 lb -   BMI 39.69 kg/m2 39.51 kg/m2 - 40.24 kg/m2 - 38.96 kg/m2 -   Ideal Body Wt - - 125 - 134 - 134   Excess Body Wt - - 98.2 - 93 - 86   Goal Wt - - 144.64 - 153 - -   Wt loss to date - - 0 - 0 - 0   % Wt Loss - - 0 - 0 - 0   80% EBW - - 78.56 - 74.4 - 68.8       Body mass index is 39.69 kg/m². Comorbidities:    Hypertension: improved  Diabetes: not applicable  Obstructive Sleep Apnea: improved  Hyperlipidemia: not applicable  Stress Urinary Incontinence: not applicable  Gastroesophageal Reflux: not applicable  Weight related arthropathy:not applicable        Past Medical History:   Diagnosis Date    Constipation     Coxsackie carditis 1986    Hypertension     Sleep apnea     cpap       Past Surgical History:   Procedure Laterality Date    ADJUSTMENT GASTRIC BAND      HX CHOLECYSTECTOMY  2018    HX GI  11/20/2017    removal of Lap Band    HX HEENT  20007    Plastic surgery to repair face after dog bite    HX LIPECTOMY         Current Outpatient Medications   Medication Sig Dispense Refill    enoxaparin (LOVENOX) 40 mg/0.4 mL 0.4 mL by SubCUTAneous route daily. 7 Syringe 0    cpap machine kit by Does Not Apply route.  tretinoin (RETIN-A) 0.025 % topical cream Use 1 Application to affected area Every Night at Bedtime. APPLY 20-30 MINUTES AFTER WASHING/DRYING SKIN.  FOR ACNE         Allergies   Allergen Reactions    Hydrocodone Nausea and Vomiting     Nausea         Objective:     Visit Vitals  /73 (BP 1 Location: Right arm, BP Patient Position: Sitting)   Pulse (!) 117   Temp 95.5 °F (35.3 °C) (Oral)   Ht 5' 2\" (1.575 m)   Wt 98.4 kg (217 lb)   SpO2 96%   BMI 39.69 kg/m²       General:  alert, cooperative, no distress, appears stated age   Chest: no accessory muscle use   Cor:   Regular rate and rhythm   Abdomen: soft, bowel sounds active, non-tender   Incision:   healing well, no drainage, no erythema, no hernia, no seroma, no swelling, no dehiscence, incision well approximated       Labs: none    Assessment:     Doing well postoperatively.     Plan:     Increase activity to the goal of 30 minutes daily, Okay to proceed with [facility], Follow up labs as ordered, Increase fluids, Follow up with Registered Dietician, Continue MVI/Ca/B12 supplementation and removed drain  Follow up in 2 weeks

## 2019-01-30 NOTE — DISCHARGE SUMMARY
Bariatric Surgery Discharge Progress Note    Admission Date: 1/23/2019    Discharge Date: 1/25/2019      Admission Diagnosis:    Clinically severe Obesity    Comorbidities:  History of laparoscopic adjustable gastric band for morbid obesity, H/O removal of laparoscopic adjustable gastric band, H/O erosion of laparoscopic adjustable gastric band, Constipation, GUILLERMO, Coxsackie carditis    Discharge Diagnosis:     Clinically Severe Obesity, s/p laparoscopic lilian-en-y divided gastric bypass with comorbidities as listed above    Procedures:   Laparoscopic lilian-en-y divided gastric bypass    Postop Complications:Pt with slow transit time on UGI follow procedure on POD 1 with GERD sx's and difficulty achieving intake goals. Resolved by POD 2. Hospital Course:  Patient was admitted on 1/23/2019 for scheduled bariatric surgery. Operation was without significant complication. Patient admitted to the floor postoperatively, monitored as per protocol. Diet sequentially advanced beginning POD 1, pain medications transitioned to oral during the hospital course. Her UGI showed edema without obstruction at the [address] junction. This was a normal post-op finding. She tolerated liquids well reaching goal on POD 2. At the time of discharge, the patient is afebrile, vital signs stable, tolerating a clear liquid diet with protein supplementation, voiding spontaneously, ambulatory with adequate pain control with oral medications and clear surgical sites without evidence of infection.     Discharge Diet:  Clear Liquid Bariatric Diet for 7 days, then soft moist protein diet for 5 weeks    Discharge Medications:   *All medications as per Medical Reconciliation Form\"    Bariatric Chewable vitamins, 2 orally daily for life  Calcium Citrate 2000mg orally daily for life  Vitamin B12 1000micrograms sublingual daily for life  Oxycodone 5mg tab 1-2 by mouth every 4-6 hours as needed for pain uncontrolled with Tylenol  Enoxaparin (Lovenox) 40mg sub-Q daily for 7 days    Discharge disposition: home      Local wound care with daily showers, keep wounds clean and dry    Activity: as desired, no lifting greater than 15lbs or situps for 30 days    Special Instructions:   No driving until activity is not influenced by incisional pain and off narcotics   No bath or hot tub until wounds are healed   Pulse and temperature twice daily for 10 days   Notify [facility] for a Temp >100.5 or Pulse>115    Followup with surgeon in 2 weeks    Emma Bonilla MS, PAMarthaC none

## 2019-02-04 ENCOUNTER — OFFICE VISIT (OUTPATIENT)
Dept: SURGERY | Age: 59
End: 2019-02-04

## 2019-02-04 VITALS
SYSTOLIC BLOOD PRESSURE: 148 MMHG | BODY MASS INDEX: 39.2 KG/M2 | WEIGHT: 213 LBS | HEIGHT: 62 IN | HEART RATE: 100 BPM | TEMPERATURE: 98.7 F | RESPIRATION RATE: 20 BRPM | DIASTOLIC BLOOD PRESSURE: 89 MMHG

## 2019-02-04 DIAGNOSIS — K91.2 POSTOPERATIVE MALABSORPTION: Primary | ICD-10-CM

## 2019-02-04 RX ORDER — CYANOCOBALAMIN 1000 UG/ML
1000 INJECTION, SOLUTION INTRAMUSCULAR; SUBCUTANEOUS ONCE
COMMUNITY

## 2019-02-04 RX ORDER — LANOLIN ALCOHOL/MO/W.PET/CERES
1000 CREAM (GRAM) TOPICAL DAILY
COMMUNITY

## 2019-02-04 RX ORDER — ONDANSETRON 4 MG/1
TABLET, ORALLY DISINTEGRATING ORAL
COMMUNITY
Start: 2019-01-24 | End: 2019-02-04

## 2019-02-04 RX ORDER — HYDROCHLOROTHIAZIDE 12.5 MG/1
CAPSULE ORAL
COMMUNITY
Start: 2018-11-19 | End: 2019-02-04

## 2019-02-04 RX ORDER — CHOLECALCIFEROL (VITAMIN D3) 125 MCG
CAPSULE ORAL
COMMUNITY

## 2019-02-04 RX ORDER — PEDIATRIC MULTIVITAMIN NO.17
1 TABLET,CHEWABLE ORAL DAILY
COMMUNITY

## 2019-02-04 NOTE — PATIENT INSTRUCTIONS
If you have any questions or concerns about today's appointment, the verbal and/or written instructions you were given for follow up care, please call our office at [phone].     [facility] - [address]    [phone] office  [phone]

## 2019-02-04 NOTE — PROGRESS NOTES
Subjective:      Maru Kohler is a 64 y.o. female is now 2 weeks status post laparoscopic gastric bypass surgery and conversion from banding. Doing well overall. Currently on a stage 3 diet without difficulty. Taking in 64oz water,  60g protein. 30min of activity daily. Bowel movements are regular. The patient is not having any pain. .  The patient is compliant with multivitamins, calcium and B12 supplements. Weight Loss Metrics 2/4/2019 2/4/2019 1/28/2019 1/27/2019 1/14/2019 1/14/2019 12/26/2018   Pre op / Initial Wt 227 - - - 223.2 - 227   Today's Wt - 213 lb 217 lb 216 lb - 220 lb -   BMI - 38.96 kg/m2 39.69 kg/m2 39.51 kg/m2 - 40.24 kg/m2 -   Ideal Body Wt 134 - - - 125 - 134   Excess Body Wt 93 - - - 98.2 - 93   Goal Wt 153 - - - 144.64 - 153   Wt loss to date 14 - - - 0 - 0   % Wt Loss 0.19 - - - 0 - 0   80% EBW 74.4 - - - 78.56 - 74.4       Body mass index is 38.96 kg/m². Comorbidities:    Hypertension: improved  Diabetes: not applicable  Obstructive Sleep Apnea: improved  Hyperlipidemia: not applicable  Stress Urinary Incontinence: not applicable  Gastroesophageal Reflux: not applicable  Weight related arthropathy:not applicable        Past Medical History:   Diagnosis Date    Constipation     Coxsackie carditis 1986    Hypertension     Sleep apnea     cpap       Past Surgical History:   Procedure Laterality Date    ADJUSTMENT GASTRIC BAND      HX CHOLECYSTECTOMY  2018    HX GI  11/20/2017    removal of Lap Band    HX HEENT  20007    Plastic surgery to repair face after dog bite    HX LAP GASTRIC BYPASS  01/23/2019    HX LIPECTOMY         Current Outpatient Medications   Medication Sig Dispense Refill    pediatric multivitamins chewable tablet Take 1 Tab by mouth daily.  cyanocobalamin (VITAMIN B-12) 1,000 mcg/mL injection 1,000 mcg by IntraMUSCular route once.  cyanocobalamin 1,000 mcg tablet Take 1,000 mcg by mouth daily.       cholecalciferol, vitamin D3, (VITAMIN D3) 2,000 unit tab Take  by mouth.  iron,carb/vit C/vit B12/folic (IRON 554 PLUS PO) Take  by mouth.  cpap machine kit by Does Not Apply route.  tretinoin (RETIN-A) 0.025 % topical cream Use 1 Application to affected area Every Night at Bedtime. APPLY 20-30 MINUTES AFTER WASHING/DRYING SKIN. FOR ACNE         Allergies   Allergen Reactions    Hydrocodone Nausea and Vomiting     Nausea         Objective:     Visit Vitals  /89 (BP 1 Location: Left arm, BP Patient Position: At rest)   Pulse 100   Temp 98.7 °F (37.1 °C) (Oral)   Resp 20   Ht 5' 2\" (1.575 m)   Wt 96.6 kg (213 lb)   BMI 38.96 kg/m²       General:  alert, cooperative, no distress, appears stated age   Chest: no accessory muscle use   Cor:   Regular rate and rhythm   Abdomen: soft, bowel sounds active, non-tender   Incision:   healing well, no drainage, no erythema, no hernia, no seroma, no swelling, no dehiscence, incision well approximated       Labs: none    Assessment:     Doing well postoperatively.     Plan:     Increase activity to the goal of 30 minutes daily, Follow up labs as ordered, Increase fluids, Follow up with Registered Dietician and Continue MVI/Ca/B12 supplementation  Follow up in 3 months

## 2019-02-04 NOTE — LETTER
2/4/2019 12:38 PM 
 
Patient:  Sarai Walhs YOB: 1960 Date of Visit: 2/4/2019 Margarita Hashimoto., MD 
[address] [facility] [address] VIA Facsimile: [phone] Dear Margarita Hashimoto., MD, Thank you for referring Ms. Carmella Bailey to [place] [address] for evaluation and treatment. Below are the relevant portions of my assessment and plan of care. Subjective:  
  
Sarai Walsh is a 64 y.o. female is now 2 weeks status post laparoscopic gastric bypass surgery and conversion from banding. Doing well overall. Currently on a stage 3 diet without difficulty. Taking in 64oz water,  60g protein. 30min of activity daily. Bowel movements are regular. The patient is not having any pain. Annamary Ripa The patient is compliant with multivitamins, calcium and B12 supplements. Weight Loss Metrics 2/4/2019 2/4/2019 1/28/2019 1/27/2019 1/14/2019 1/14/2019 12/26/2018 Pre op / Initial Wt 227 - - - 223.2 - 227 Today's Wt - 213 lb 217 lb 216 lb - 220 lb -  
BMI - 38.96 kg/m2 39.69 kg/m2 39.51 kg/m2 - 40.24 kg/m2 - Ideal Body Wt 134 - - - 125 - 134 Excess Body Wt 93 - - - 98.2 - 93 Goal Wt 153 - - - 144.64 - 153 Wt loss to date 14 - - - 0 - 0  
% Wt Loss 0.19 - - - 0 - 0  
80% EBW 74.4 - - - 78.56 - 74.4 Body mass index is 38.96 kg/m². Comorbidities: Hypertension: improved Diabetes: not applicable Obstructive Sleep Apnea: improved Hyperlipidemia: not applicable Stress Urinary Incontinence: not applicable Gastroesophageal Reflux: not applicable Weight related arthropathy:not applicable Past Medical History:  
Diagnosis Date  Constipation  Coxsackie carditis 1986  Hypertension  Sleep apnea   
 cpap Past Surgical History:  
Procedure Laterality Date  ADJUSTMENT GASTRIC BAND  HX CHOLECYSTECTOMY  2018  HX GI  11/20/2017  
 removal of Lap Band  HX [facility]  [id number] Plastic surgery to repair face after dog bite  HX LAP GASTRIC BYPASS  01/23/2019  HX LIPECTOMY Current Outpatient Medications Medication Sig Dispense Refill  pediatric multivitamins chewable tablet Take 1 Tab by mouth daily.  cyanocobalamin (VITAMIN B-12) 1,000 mcg/mL injection 1,000 mcg by IntraMUSCular route once.  cyanocobalamin 1,000 mcg tablet Take 1,000 mcg by mouth daily.  cholecalciferol, vitamin D3, (VITAMIN D3) 2,000 unit tab Take  by mouth.  iron,carb/vit C/vit B12/folic (IRON 690 PLUS PO) Take  by mouth.  cpap machine kit by Does Not Apply route.  tretinoin (RETIN-A) 0.025 % topical cream Use 1 Application to affected area Every Night at Bedtime. APPLY 20-30 MINUTES AFTER WASHING/DRYING SKIN. FOR ACNE Allergies Allergen Reactions  Hydrocodone Nausea and Vomiting Nausea Objective:  
 
Visit Vitals /89 (BP 1 Location: Left arm, BP Patient Position: At rest) Pulse 100 Temp 98.7 °F (37.1 °C) (Oral) Resp 20 Ht 5' 2\" (1.575 m) Wt 96.6 kg (213 lb) BMI 38.96 kg/m² General:  alert, cooperative, no distress, appears stated age Chest: no accessory muscle use Cor:   Regular rate and rhythm Abdomen: soft, bowel sounds active, non-tender Incision:   healing well, no drainage, no erythema, no hernia, no seroma, no swelling, no dehiscence, incision well approximated Labs: none Assessment:  
 
Doing well postoperatively. Plan:  
 
Increase activity to the goal of 30 minutes daily, Follow up labs as ordered, Increase fluids, Follow up with Registered Dietician and Continue MVI/Ca/B12 supplementation Follow up in 3 months Thank you very much for your referral of Ms. Natty Vora. If you have questions, please do not hesitate to call me. I look forward to following Ms. Kaur Silver along with you and will keep you updated as to her progress.   
 
 
 
 
Sincerely, 
 
 
Andrews Melendez MD

## 2019-02-04 NOTE — PROGRESS NOTES
No Delatorre is a 64 y.o. female that presents today to follow up post  LGBP  preformed on 1/23/2019. Pt says pain level is at a 0 on a scale from 1-10. Pt is drinking 64 oz of fluid daily. Pt is currently eating Protein shakes,yogurt,cottage sheet, brother. Pt says the amount of time spent exercising is cardio  exercise 3 days a week. Body mass index is 38.96 kg/m². 1. Have you been to the ER, urgent care clinic since your last visit? Hospitalized since your last visit? No    2. Have you seen or consulted any other health care providers outside of the [facility] since your last visit? Include any pap smears or colon screening.  No

## 2019-02-04 NOTE — COMMUNICATION BODY
Subjective:      Luz Marina Garrett is a 64 y.o. female is now 2 weeks status post laparoscopic gastric bypass surgery and conversion from banding. Doing well overall. Currently on a stage 3 diet without difficulty. Taking in 64oz water,  60g protein. 30min of activity daily. Bowel movements are regular. The patient is not having any pain. .  The patient is compliant with multivitamins, calcium and B12 supplements. Weight Loss Metrics 2/4/2019 2/4/2019 1/28/2019 1/27/2019 1/14/2019 1/14/2019 12/26/2018   Pre op / Initial Wt 227 - - - 223.2 - 227   Today's Wt - 213 lb 217 lb 216 lb - 220 lb -   BMI - 38.96 kg/m2 39.69 kg/m2 39.51 kg/m2 - 40.24 kg/m2 -   Ideal Body Wt 134 - - - 125 - 134   Excess Body Wt 93 - - - 98.2 - 93   Goal Wt 153 - - - 144.64 - 153   Wt loss to date 14 - - - 0 - 0   % Wt Loss 0.19 - - - 0 - 0   80% EBW 74.4 - - - 78.56 - 74.4       Body mass index is 38.96 kg/m². Comorbidities:    Hypertension: improved  Diabetes: not applicable  Obstructive Sleep Apnea: improved  Hyperlipidemia: not applicable  Stress Urinary Incontinence: not applicable  Gastroesophageal Reflux: not applicable  Weight related arthropathy:not applicable        Past Medical History:   Diagnosis Date    Constipation     Coxsackie carditis 1986    Hypertension     Sleep apnea     cpap       Past Surgical History:   Procedure Laterality Date    ADJUSTMENT GASTRIC BAND      HX CHOLECYSTECTOMY  2018    HX GI  11/20/2017    removal of Lap Band    HX HEENT  20007    Plastic surgery to repair face after dog bite    HX LAP GASTRIC BYPASS  01/23/2019    HX LIPECTOMY         Current Outpatient Medications   Medication Sig Dispense Refill    pediatric multivitamins chewable tablet Take 1 Tab by mouth daily.  cyanocobalamin (VITAMIN B-12) 1,000 mcg/mL injection 1,000 mcg by IntraMUSCular route once.  cyanocobalamin 1,000 mcg tablet Take 1,000 mcg by mouth daily.       cholecalciferol, vitamin D3, (VITAMIN D3) 2,000 unit tab Take  by mouth.  iron,carb/vit C/vit B12/folic (IRON 658 PLUS PO) Take  by mouth.  cpap machine kit by Does Not Apply route.  tretinoin (RETIN-A) 0.025 % topical cream Use 1 Application to affected area Every Night at Bedtime. APPLY 20-30 MINUTES AFTER WASHING/DRYING SKIN. FOR ACNE         Allergies   Allergen Reactions    Hydrocodone Nausea and Vomiting     Nausea         Objective:     Visit Vitals  /89 (BP 1 Location: Left arm, BP Patient Position: At rest)   Pulse 100   Temp 98.7 °F (37.1 °C) (Oral)   Resp 20   Ht 5' 2\" (1.575 m)   Wt 96.6 kg (213 lb)   BMI 38.96 kg/m²       General:  alert, cooperative, no distress, appears stated age   Chest: no accessory muscle use   Cor:   Regular rate and rhythm   Abdomen: soft, bowel sounds active, non-tender   Incision:   healing well, no drainage, no erythema, no hernia, no seroma, no swelling, no dehiscence, incision well approximated       Labs: none    Assessment:     Doing well postoperatively.     Plan:     Increase activity to the goal of 30 minutes daily, Follow up labs as ordered, Increase fluids, Follow up with Registered Dietician and Continue MVI/Ca/B12 supplementation  Follow up in 3 months

## 2019-03-18 ENCOUNTER — TELEPHONE (OUTPATIENT)
Dept: OTHER | Age: 59
End: 2019-03-18

## 2019-03-18 NOTE — TELEPHONE ENCOUNTER
Per MBSAQ[employer] 30 day post surgical requirements:  Phone call placed. Left message. Email sent  Dear Patient,    Thank you for choosing Dr. Neisha Corral for your care. You had surgery on January 23, 2019. We are interested in how you have been feeling since your surgery. The Department of Surgery at our hospital are members of the Metabolic and Bariatric Surgery Accreditation and Quality Improvement Program [facility]). We are gathering information on the outcomes of our patients after surgery. Please take a few minutes to answer the questions below and return this letter via mail or e-mail to _________________. Your answers are confidential.    ¨ Have you been to a hospital or seen by a doctor for any reason since your surgery? Yes    No    If you answered NO, you do not need to answer any more questions. If you have answered YES, please answer the following questions (use back of page if additional space is needed). 1.  Have you been seen in an outpatient clinic or doctors office after your surgery? Yes    No    a. If yes, was this visit for routine follow-up? Yes    No    b. If no, what was the reason for your visit?       c.  Date(s) of visit(s):       d. Have you experienced any health problems since your surgery? Yes    No    e. If yes, please explain:          2. Did you go to an Emergency Department (ED) or hospital after your surgery? Yes    No    a. Were you admitted? Yes    No    b. If yes, please explain:       c.  Date(s) of ED visit or hospitalization:       d.  Did you have any additional surgery(ies) during this hospitalization? Yes    No    e. If yes, what type of surgery(ies) did you have?      f.  Date(s) of surgery(ies): Your health and feedback are important to us. We greatly appreciate your response. Thank you.     Sincerely,  [address] [facility] Loss [address] [facility]

## 2019-06-06 ENCOUNTER — DOCUMENTATION ONLY (OUTPATIENT)
Dept: SURGERY | Age: 59
End: 2019-06-06

## 2019-06-06 NOTE — PROGRESS NOTES
Per [facility] requirements;  E-mail and letter sent for follow up appointment. [address] [facility] Loss [place]   3 [facility]  [facility]        Dear Patient,        Your health is our main concern. It is important for your health to have follow-up lab work and to see you surgeon at 3 months, 6 months and annually after your weight loss surgery. Additionally, the Department of Bariatric Surgery at our hospital is a member of the [employer] [address] Surgical Quality Improvement Program ([facility] NSQIP). As a participant in this program, we gather information on the outcomes of our patients after surgery. Please call the office for a follow up appointment at [phone]. If you have moved out of the area or have changed surgeons please call us and let us know the name of your doctor. Your health and feedback are important to us. We greatly appreciate your response.        Thank you,  3 [facility] Loss [facility]5 [facility]

## 2019-06-06 NOTE — LETTER
[facility] [place] [facility] Dear Patient, Your health is our main concern. It is important for your health to have follow-up lab work and to see you surgeon at 3 months, 6 months and annually after your weight loss surgery. Additionally, the Department of Bariatric Surgery at our hospital is a member of the [employer] of [address] [place] [address] Surgical Quality Improvement Program ([facility] NSQIP). As a participant in this program, we gather information on the outcomes of our patients after surgery. Please call the office for a follow up appointment at [phone]. If you have moved out of the area or have changed surgeons please call us and let us know the name of your doctor. Your health and feedback are important to us. We greatly appreciate your response. Thank you, Charlotte Menendez [place] Loss [facility]

## 2019-12-05 ENCOUNTER — DOCUMENTATION ONLY (OUTPATIENT)
Dept: SURGERY | Age: 59
End: 2019-12-05

## 2019-12-05 NOTE — PROGRESS NOTES
Per [facility] requirements;  E-mail and letter sent for follow up appointment. [facility] Loss [place]   [facility]  [facility]        Dear Patient,        Your health is our main concern. It is important for your health to have follow-up lab work and to see you surgeon at 3 months, 6 months and annually after your weight loss surgery. Additionally, the Department of Bariatric Surgery at our hospital is a member of the [employer] [address] Surgical Quality Improvement Program ([facility] NSQIP). As a participant in this program, we gather information on the outcomes of our patients after surgery. Please call the office for a follow up appointment at [phone]. If you have moved out of the area or have changed surgeons please call us and let us know the name of your doctor. Your health and feedback are important to us. We greatly appreciate your response.        Thank you,  [facility] Loss [address] [facility]

## 2019-12-05 NOTE — LETTER
[address] [facility] Loss [place] [id number] [facility] Dear Patient, Your health is our main concern. It is important for your health to have follow-up lab work and to see you surgeon at 3 months, 6 months and annually after your weight loss surgery. Additionally, the Department of Bariatric Surgery at our hospital is a member of the [employer] of [address] [place] [address] Surgical Quality Improvement Program ([facility] NSQIP). As a participant in this program, we gather information on the outcomes of our patients after surgery. Please call the office for a follow up appointment at [phone]. If you have moved out of the area or have changed surgeons please call us and let us know the name of your doctor. Your health and feedback are important to us. We greatly appreciate your response. Thank you, [address] [place] [address] Loss [address]

## 2022-01-01 NOTE — PROGRESS NOTES
I have reviewed discharge instructions with the patient. The patient verbalized understanding. Ampicillin

## 2024-07-30 ENCOUNTER — NEW PATIENT (OUTPATIENT)
Dept: URBAN - METROPOLITAN AREA CLINIC 2 | Facility: CLINIC | Age: 64
End: 2024-07-30

## 2024-07-30 DIAGNOSIS — H52.4: ICD-10-CM

## 2024-07-30 DIAGNOSIS — H52.223: ICD-10-CM

## 2024-07-30 DIAGNOSIS — H52.01: ICD-10-CM

## 2024-07-30 DIAGNOSIS — H52.12: ICD-10-CM

## 2024-07-30 PROCEDURE — 92015 DETERMINE REFRACTIVE STATE: CPT

## 2024-07-30 PROCEDURE — 92004 COMPRE OPH EXAM NEW PT 1/>: CPT

## 2024-07-30 ASSESSMENT — VISUAL ACUITY
OS_SC: 20/150
OS_SC: J1
OD_SC: 20/40

## 2024-07-30 ASSESSMENT — KERATOMETRY
OS_K2POWER_DIOPTERS: 45.75
OS_AXISANGLE_DEGREES: 41
OD_K2POWER_DIOPTERS: 42.75
OD_AXISANGLE2_DEGREES: 081
OS_K1POWER_DIOPTERS: 44.75
OD_K1POWER_DIOPTERS: 42.25
OS_AXISANGLE2_DEGREES: 131
OD_AXISANGLE_DEGREES: 171

## 2024-07-30 ASSESSMENT — TONOMETRY
OS_IOP_MMHG: 16
OD_IOP_MMHG: 14

## (undated) DEVICE — STAPLE INT WHT BLU G GRN BLK REINF FOR ENDOPATH ECHELON FLX

## (undated) DEVICE — KIT CATH OD16FR 5ML BLLN SIL URIN INDWL STR TIP INF CTRL

## (undated) DEVICE — SUTURE VCRL SZ 3-0 L27IN ABSRB VLT L26MM SH 1/2 CIR J316H

## (undated) DEVICE — SUT SLK 2-0SH 30IN BLK --

## (undated) DEVICE — LIGHT HANDLE: Brand: DEVON

## (undated) DEVICE — UNIVERSAL FIXATION CANNULA: Brand: VERSAONE

## (undated) DEVICE — SHEAR HARMONIC ACET 5MMX36CM -- ACE PLUS

## (undated) DEVICE — Device

## (undated) DEVICE — KENDALL SCD EXPRESS SLEEVES, KNEE LENGTH, MEDIUM: Brand: KENDALL SCD

## (undated) DEVICE — TRUE CONTENT TO BE POPULATED AS PART OF REBRANDING: Brand: ARGYLE

## (undated) DEVICE — GLOVE SURG SZ 7.5 L11.73IN FNGR THK7.5MIL STRW LTX POLYMER

## (undated) DEVICE — SLEEVE TRCR L100MM DIA5MM UNIV STBL FOR BLDELSS DIL TIP

## (undated) DEVICE — SLEEVE COMPR STD 12 IN FOR 165IN CALF COMFORT VENODYNE SYS

## (undated) DEVICE — RELOAD STPL L60MM H1-2.6MM MESENTERY THN TISS WHT 6 ROW

## (undated) DEVICE — TROCAR LAP L100MM DIA5MM BLDELSS W/ STBL SL ENDOPATH XCEL

## (undated) DEVICE — SOLUTION IRRIG 1000ML LAC RINGER PLAS POUR BTL

## (undated) DEVICE — SPONGE GZ W2XL2IN 12 PLY 100% WVN COT PRE COUNTED

## (undated) DEVICE — INTENDED FOR TISSUE SEPARATION, AND OTHER PROCEDURES THAT REQUIRE A SHARP SURGICAL BLADE TO PUNCTURE OR CUT.: Brand: BARD-PARKER ® CARBON RIB-BACK BLADES

## (undated) DEVICE — SOLUTION SCRB 4OZ 4% CHG CLN BASE FOR PT SKIN ANTISEPSIS

## (undated) DEVICE — UNIVERSAL FIXATION CANNULA: Brand: VERSAPORT

## (undated) DEVICE — SUTURE ETHLN SZ 2-0 L18IN NONABSORBABLE BLK L26MM PS 3/8 585H

## (undated) DEVICE — SHEARS: Brand: ENDO SHEARS

## (undated) DEVICE — PACK PROCEDURE SURG LAPAROSCOPY 17X7 MM BRTRC PRIMUS

## (undated) DEVICE — PREP SKN CHLORHEX GLUC 8OZ --

## (undated) DEVICE — DRAIN SURG L0.75IN TRCR

## (undated) DEVICE — STAPLER SKIN L440MM 32MM LNG 12 FIRING B FRM PWR + GRIPPING

## (undated) DEVICE — SOL IRR STRL H2O 500ML STRL --

## (undated) DEVICE — SYR LR LCK 1ML GRAD NSAF 30ML --

## (undated) DEVICE — APPLICATOR BNDG 1MM ADH PREMIERPRO EXOFIN

## (undated) DEVICE — RELOAD STPL L60MM H1.5-3.6MM REG TISS BLU GRIPPING SURF B

## (undated) DEVICE — DISPOSABLE SUCTION/IRRIGATOR TUBE SET WITH TIP: Brand: AHTO

## (undated) DEVICE — SPECIMEN RETRIEVAL POUCH: Brand: ENDO CATCH GOLD

## (undated) DEVICE — TROCAR RMFG ENDOSCP BLDLS 12MM -- LAWSON OEM ITEM 162085

## (undated) DEVICE — SUTURE VCRL SZ 2-0 L54IN ABSRB VLT W/O NDL POLYGLACTIN 910 J618H

## (undated) DEVICE — REM POLYHESIVE ADULT PATIENT RETURN ELECTRODE: Brand: VALLEYLAB

## (undated) DEVICE — BAG SPEC RETRV 275ML 10ML DISPOSABLE RELIACATCH

## (undated) DEVICE — SUTURE MCRYL SZ 4-0 L27IN ABSRB UD L24MM PS-1 3/8 CIR PRIM Y935H

## (undated) DEVICE — VISUALIZATION SYSTEM: Brand: CLEARIFY

## (undated) DEVICE — SOLUTION IRRIG 1000ML H2O STRL BLT

## (undated) DEVICE — TROCAR RMFG ENDOPATH 12X100M --

## (undated) DEVICE — TROCAR ENDOSCP L100MM DIA12MM STBL SL BLDELSS ENDOPATH XCEL

## (undated) DEVICE — RELOAD STPL H4.1X2MM DIA60MM THCK TISS GRN 6 ROW PWR GST B

## (undated) DEVICE — BLADELESS OPTICAL TROCAR WITH FIXATION CANNULA: Brand: VERSAPORT